# Patient Record
Sex: FEMALE | Race: BLACK OR AFRICAN AMERICAN | NOT HISPANIC OR LATINO | Employment: FULL TIME | RURAL
[De-identification: names, ages, dates, MRNs, and addresses within clinical notes are randomized per-mention and may not be internally consistent; named-entity substitution may affect disease eponyms.]

---

## 2017-05-24 ENCOUNTER — OFFICE VISIT (OUTPATIENT)
Dept: OBSTETRICS AND GYNECOLOGY | Facility: CLINIC | Age: 62
End: 2017-05-24

## 2017-05-24 VITALS
BODY MASS INDEX: 37.74 KG/M2 | DIASTOLIC BLOOD PRESSURE: 88 MMHG | WEIGHT: 213 LBS | SYSTOLIC BLOOD PRESSURE: 150 MMHG | HEIGHT: 63 IN

## 2017-05-24 DIAGNOSIS — E88.81 INSULIN RESISTANCE: Chronic | ICD-10-CM

## 2017-05-24 DIAGNOSIS — N95.1 MENOPAUSAL AND FEMALE CLIMACTERIC STATES: Primary | Chronic | ICD-10-CM

## 2017-05-24 DIAGNOSIS — E66.9 OBESITY, CLASS II, BMI 35-39.9: Chronic | ICD-10-CM

## 2017-05-24 DIAGNOSIS — I10 ESSENTIAL HYPERTENSION: Chronic | ICD-10-CM

## 2017-05-24 PROCEDURE — 99214 OFFICE O/P EST MOD 30 MIN: CPT | Performed by: OBSTETRICS & GYNECOLOGY

## 2017-05-24 RX ORDER — PHENTERMINE HYDROCHLORIDE 30 MG/1
30 CAPSULE ORAL EVERY MORNING
Qty: 60 CAPSULE | Refills: 2 | Status: SHIPPED | OUTPATIENT
Start: 2017-05-24 | End: 2017-11-01 | Stop reason: SDUPTHER

## 2017-05-24 RX ORDER — PHENTERMINE HYDROCHLORIDE 30 MG/1
30 CAPSULE ORAL EVERY MORNING
COMMUNITY
End: 2017-05-24 | Stop reason: SDUPTHER

## 2017-05-24 RX ORDER — CHLORAL HYDRATE 500 MG
1000 CAPSULE ORAL
COMMUNITY

## 2017-05-24 RX ORDER — ERGOCALCIFEROL 1.25 MG/1
50000 CAPSULE ORAL WEEKLY
COMMUNITY

## 2017-05-24 RX ORDER — THIAMINE MONONITRATE (VIT B1) 100 MG
100 TABLET ORAL DAILY
COMMUNITY

## 2017-05-24 RX ORDER — PHENTERMINE HYDROCHLORIDE 37.5 MG/1
37.5 CAPSULE ORAL
Qty: 60 CAPSULE | Refills: 2 | Status: SHIPPED | OUTPATIENT
Start: 2017-05-24 | End: 2017-11-01 | Stop reason: SDUPTHER

## 2017-05-24 RX ORDER — PHENTERMINE HYDROCHLORIDE 37.5 MG/1
37.5 CAPSULE ORAL EVERY MORNING
COMMUNITY
End: 2017-05-24 | Stop reason: SDUPTHER

## 2017-05-24 RX ORDER — CLONIDINE HYDROCHLORIDE 0.3 MG/1
0.3 TABLET ORAL DAILY
COMMUNITY

## 2017-05-24 RX ORDER — ASPIRIN 81 MG/1
81 TABLET ORAL DAILY
COMMUNITY

## 2017-05-24 RX ORDER — MELOXICAM 7.5 MG/1
7.5 TABLET ORAL DAILY
COMMUNITY
End: 2018-06-13 | Stop reason: DRUGHIGH

## 2017-08-16 ENCOUNTER — OFFICE VISIT (OUTPATIENT)
Dept: OBSTETRICS AND GYNECOLOGY | Facility: CLINIC | Age: 62
End: 2017-08-16

## 2017-08-16 VITALS
HEIGHT: 63 IN | SYSTOLIC BLOOD PRESSURE: 134 MMHG | DIASTOLIC BLOOD PRESSURE: 72 MMHG | WEIGHT: 211.6 LBS | BODY MASS INDEX: 37.49 KG/M2

## 2017-08-16 DIAGNOSIS — E88.81 INSULIN RESISTANCE: Chronic | ICD-10-CM

## 2017-08-16 DIAGNOSIS — E66.9 OBESITY, CLASS II, BMI 35-39.9: Chronic | ICD-10-CM

## 2017-08-16 DIAGNOSIS — I10 ESSENTIAL HYPERTENSION: Chronic | ICD-10-CM

## 2017-08-16 DIAGNOSIS — N95.1 MENOPAUSAL AND FEMALE CLIMACTERIC STATES: Primary | Chronic | ICD-10-CM

## 2017-08-16 PROCEDURE — 99214 OFFICE O/P EST MOD 30 MIN: CPT | Performed by: OBSTETRICS & GYNECOLOGY

## 2017-08-16 NOTE — PROGRESS NOTES
Zuleyka Maharaj is a 61 y.o. y/o female     Chief Complaint: Lethargy and weight gain and hypertension and insulin resistance.    HPI: Patient is a 61-year-old  with 3 prior vaginal deliveries who has had her tubes tied.  She is postmenopausal and not having any vaginal bleeding.   She only has rare hot flashes.  She has hypertension and insulin resistance and morbid obesity.  She is currently on clonidine, Mobic, metformin, multivitamin, vitamin D, B-1, fish oil, aspirin 81 mg, and phentermine.  She has tried Wellbutrin in the past, and she did not like the way she felt on that.  She feels that the phentermine capsules help her the most.    She is currently taking phentermine 30 mg capsule each morning and 37.5 tablet at noon.    Her blood pressures 134/72 and her pulse is 78.    Her bowels are working normally.  She does not have any constipation or diarrhea.  She does not have any trouble sleeping.  She does not have any heart palpitations.    Review of Systems   Constitutional: Positive for fatigue. Negative for activity change, appetite change, chills, diaphoresis, fever and unexpected weight change.   Gastrointestinal: Negative for abdominal pain, constipation, diarrhea and nausea.   Genitourinary: Negative for difficulty urinating, dyspareunia, dysuria, menstrual problem, pelvic pain, urgency, vaginal bleeding, vaginal discharge and vaginal pain.   Neurological: Negative for headaches.   Psychiatric/Behavioral: Positive for dysphoric mood. The patient is not nervous/anxious.    All other systems reviewed and are negative.     Breast ROS: negative    The following portions of the patient's history were reviewed and updated as appropriate: allergies, current medications, past family history, past medical history, past social history, past surgical history and problem list.    Allergies   Allergen Reactions   • Codeine           Current Outpatient Prescriptions:   •  aspirin 81 MG EC tablet, Take 81 mg  by mouth Daily., Disp: , Rfl:   •  CloNIDine (CATAPRES) 0.3 MG tablet, Take 0.3 mg by mouth Daily., Disp: , Rfl:   •  meloxicam (MOBIC) 7.5 MG tablet, Take 7.5 mg by mouth Daily., Disp: , Rfl:   •  metFORMIN (GLUCOPHAGE) 500 MG tablet, Take 500 mg by mouth 2 (Two) Times a Day With Meals., Disp: , Rfl:   •  Multiple Vitamins-Minerals (MULTI COMPLETE PO), Take 1 tablet by mouth Daily., Disp: , Rfl:   •  Omega-3 Fatty Acids (FISH OIL) 1000 MG capsule capsule, Take 1,000 mg by mouth Daily With Breakfast., Disp: , Rfl:   •  phentermine 30 MG capsule, Take 1 capsule by mouth Every Morning., Disp: 60 capsule, Rfl: 2  •  phentermine 37.5 MG capsule, Take 1 capsule by mouth Daily Before Lunch., Disp: 60 capsule, Rfl: 2  •  thiamine (VITAMIN B-1) 100 MG tablet, Take 100 mg by mouth Daily., Disp: , Rfl:   •  vitamin D (ERGOCALCIFEROL) 04711 UNITS capsule capsule, Take 50,000 Units by mouth 1 (One) Time Per Week., Disp: , Rfl:      The patient has a family history of   No family history on file.     Past Medical History:   Diagnosis Date   • Essential hypertension 5/24/2017   • Insulin resistance 5/24/2017   • Menopausal and female climacteric states 5/24/2017   • Obesity, Class II, BMI 35-39.9 5/24/2017        OB History     No data available           Social History     Social History   • Marital status:      Spouse name: N/A   • Number of children: N/A   • Years of education: N/A     Occupational History   • Not on file.     Social History Main Topics   • Smoking status: Never Smoker   • Smokeless tobacco: Never Used   • Alcohol use No   • Drug use: No   • Sexual activity: Not on file     Other Topics Concern   • Not on file     Social History Narrative        No past surgical history on file.     Patient Active Problem List   Diagnosis   • Menopausal and female climacteric states   • Obesity, Class II, BMI 35-39.9   • Essential hypertension   • Insulin resistance        Documented Vitals    08/16/17 1358   BP: 134/72  "  Weight: 211 lb 9.6 oz (96 kg)   Height: 63\" (160 cm)   PainSc: 0-No pain       Physical Exam   Constitutional: She is oriented to person, place, and time. No distress.   Obese black female weighing 211.6 pounds with BMI 37.5.  She has lost 1.4 pounds.   HENT:   Head: Normocephalic and atraumatic.   Eyes: Conjunctivae and EOM are normal. Pupils are equal, round, and reactive to light.   Neck: Normal range of motion. Neck supple. No JVD present. No tracheal deviation present. No thyromegaly present.   Cardiovascular: Normal rate, regular rhythm, normal heart sounds and intact distal pulses.  Exam reveals no gallop and no friction rub.    No murmur heard.  Pulmonary/Chest: Effort normal and breath sounds normal. No stridor. No respiratory distress. She has no wheezes. She has no rales. She exhibits no tenderness.   Abdominal: Soft. Bowel sounds are normal. She exhibits no distension and no mass. There is no tenderness. There is no rebound and no guarding. No hernia.   Musculoskeletal: Normal range of motion. She exhibits no edema, tenderness or deformity.   Lymphadenopathy:     She has no cervical adenopathy.   Neurological: She is alert and oriented to person, place, and time. She has normal reflexes. She displays normal reflexes. No cranial nerve deficit. She exhibits normal muscle tone. Coordination normal.   Skin: Skin is warm and dry. No rash noted. She is not diaphoretic. No erythema. No pallor.   Psychiatric: She has a normal mood and affect. Her behavior is normal. Judgment and thought content normal.   Nursing note and vitals reviewed.      Assessment        Diagnosis Plan   1. Menopausal and female climacteric states     2. Insulin resistance     3. Essential hypertension     4. Obesity, Class II, BMI 35-39.9           Plan      1. Continue current medications.  2. Encouraged in diet and exercise.  3. Handouts on depression, hot flashes, exercise, and vitamin use.   4. Follow-up in 6 weeks.  Follow-up " sooner as needed.            This document has been electronically signed by Wilfredo Cody MD on August 16, 2017 6:11 PM

## 2017-09-11 RX ORDER — MELOXICAM 15 MG/1
TABLET ORAL
Qty: 90 TABLET | Refills: 3 | Status: SHIPPED | OUTPATIENT
Start: 2017-09-11

## 2017-11-01 ENCOUNTER — OFFICE VISIT (OUTPATIENT)
Dept: OBSTETRICS AND GYNECOLOGY | Facility: CLINIC | Age: 62
End: 2017-11-01

## 2017-11-01 VITALS
HEIGHT: 63 IN | DIASTOLIC BLOOD PRESSURE: 70 MMHG | WEIGHT: 208.4 LBS | BODY MASS INDEX: 36.93 KG/M2 | SYSTOLIC BLOOD PRESSURE: 126 MMHG

## 2017-11-01 DIAGNOSIS — N95.1 MENOPAUSAL SYNDROME: Primary | Chronic | ICD-10-CM

## 2017-11-01 DIAGNOSIS — E66.9 OBESITY, CLASS II, BMI 35-39.9: Chronic | ICD-10-CM

## 2017-11-01 DIAGNOSIS — E88.81 INSULIN RESISTANCE: Chronic | ICD-10-CM

## 2017-11-01 DIAGNOSIS — I10 ESSENTIAL HYPERTENSION: Chronic | ICD-10-CM

## 2017-11-01 DIAGNOSIS — L03.011 INFECTION OF NAIL BED OF FINGER OF RIGHT HAND: ICD-10-CM

## 2017-11-01 PROCEDURE — 99214 OFFICE O/P EST MOD 30 MIN: CPT | Performed by: OBSTETRICS & GYNECOLOGY

## 2017-11-01 RX ORDER — PHENTERMINE HYDROCHLORIDE 37.5 MG/1
37.5 CAPSULE ORAL
Qty: 60 CAPSULE | Refills: 3 | Status: SHIPPED | OUTPATIENT
Start: 2017-11-01 | End: 2018-03-14 | Stop reason: SDUPTHER

## 2017-11-01 RX ORDER — PHENTERMINE HYDROCHLORIDE 30 MG/1
30 CAPSULE ORAL EVERY MORNING
Qty: 60 CAPSULE | Refills: 3 | Status: SHIPPED | OUTPATIENT
Start: 2017-11-01 | End: 2018-03-14 | Stop reason: SDUPTHER

## 2017-11-01 RX ORDER — CEPHALEXIN 500 MG/1
500 CAPSULE ORAL 4 TIMES DAILY
Qty: 40 CAPSULE | Refills: 0 | Status: SHIPPED | OUTPATIENT
Start: 2017-11-01 | End: 2017-11-11

## 2017-11-01 RX ORDER — FLUCONAZOLE 150 MG/1
TABLET ORAL
Qty: 2 TABLET | Refills: 11 | Status: SHIPPED | OUTPATIENT
Start: 2017-11-01 | End: 2018-09-12 | Stop reason: SDUPTHER

## 2017-11-01 NOTE — PROGRESS NOTES
Zuleyka Maharaj is a 62 y.o. y/o female     Chief Complaint: Lethargy, weight gain, hypertension, insulin resistance, and infection of the nailbed of her third finger on her right hand    HPI: Patient is a 61-year-old  with 3 prior vaginal deliveries who has had her tubes tied.      She is postmenopausal and not having any vaginal bleeding.   She only has rare hot flashes.      She has hypertension and insulin resistance and morbid obesity.  She is currently on clonidine, Mobic, metformin, multivitamin, vitamin D, B-1, fish oil, aspirin 81 mg, and phentermine.  She has tried Wellbutrin in the past, and she did not like the way she felt on that.  She feels that the phentermine capsules help her the most.     She is currently taking phentermine 30 mg capsule each morning and 37.5 tablet at noon.     Her blood pressures 126/70 and her pulse is 72.     Her bowels are working normally.  She does not have any constipation or diarrhea.  She does not have any trouble sleeping.  She does not have any heart palpitations.    She has an infection in the nail bed of the third finger on her right hand.  She has been using Neosporin, but it has not cleared.    Review of Systems   Constitutional: Positive for fatigue. Negative for activity change, appetite change, chills, diaphoresis, fever and unexpected weight change.   Gastrointestinal: Negative for abdominal pain, constipation, diarrhea and nausea.   Genitourinary: Negative for difficulty urinating, dyspareunia, dysuria, menstrual problem, pelvic pain, urgency, vaginal bleeding, vaginal discharge and vaginal pain.   Neurological: Negative for headaches.   Psychiatric/Behavioral: Positive for dysphoric mood. The patient is not nervous/anxious.    All other systems reviewed and are negative.     Breast ROS: negative    The following portions of the patient's history were reviewed and updated as appropriate: allergies, current medications, past family history, past  medical history, past social history, past surgical history and problem list.    Allergies   Allergen Reactions   • Codeine           Current Outpatient Prescriptions:   •  aspirin 81 MG EC tablet, Take 81 mg by mouth Daily., Disp: , Rfl:   •  CloNIDine (CATAPRES) 0.3 MG tablet, Take 0.3 mg by mouth Daily., Disp: , Rfl:   •  meloxicam (MOBIC) 15 MG tablet, take 1 tablet by mouth once daily, Disp: 90 tablet, Rfl: 3  •  meloxicam (MOBIC) 7.5 MG tablet, Take 7.5 mg by mouth Daily., Disp: , Rfl:   •  metFORMIN (GLUCOPHAGE) 500 MG tablet, Take 500 mg by mouth 2 (Two) Times a Day With Meals., Disp: , Rfl:   •  Multiple Vitamins-Minerals (MULTI COMPLETE PO), Take 1 tablet by mouth Daily., Disp: , Rfl:   •  Omega-3 Fatty Acids (FISH OIL) 1000 MG capsule capsule, Take 1,000 mg by mouth Daily With Breakfast., Disp: , Rfl:   •  phentermine 30 MG capsule, Take 1 capsule by mouth Every Morning., Disp: 60 capsule, Rfl: 3  •  phentermine 37.5 MG capsule, Take 1 capsule by mouth Daily Before Lunch., Disp: 60 capsule, Rfl: 3  •  thiamine (VITAMIN B-1) 100 MG tablet, Take 100 mg by mouth Daily., Disp: , Rfl:   •  vitamin D (ERGOCALCIFEROL) 46553 UNITS capsule capsule, Take 50,000 Units by mouth 1 (One) Time Per Week., Disp: , Rfl:   •  cephalexin (KEFLEX) 500 MG capsule, Take 1 capsule by mouth 4 (Four) Times a Day for 10 days., Disp: 40 capsule, Rfl: 0  •  fluconazole (DIFLUCAN) 150 MG tablet, Take one po today and take one po in 4 days., Disp: 2 tablet, Rfl: 11     The patient has a family history of   History reviewed. No pertinent family history.     Past Medical History:   Diagnosis Date   • Essential hypertension 5/24/2017   • Insulin resistance 5/24/2017   • Menopausal and female climacteric states 5/24/2017   • Obesity, Class II, BMI 35-39.9 5/24/2017        OB History     No data available           Social History     Social History   • Marital status:      Spouse name: N/A   • Number of children: N/A   • Years of  "education: N/A     Occupational History   • Not on file.     Social History Main Topics   • Smoking status: Never Smoker   • Smokeless tobacco: Never Used   • Alcohol use No   • Drug use: No   • Sexual activity: Not on file     Other Topics Concern   • Not on file     Social History Narrative        No past surgical history on file.     Patient Active Problem List   Diagnosis   • Menopausal and female climacteric states   • Obesity, Class II, BMI 35-39.9   • Essential hypertension   • Insulin resistance        Documented Vitals    11/01/17 1131   BP: 126/70   Weight: 208 lb 6.4 oz (94.5 kg)   Height: 63\" (160 cm)       Physical Exam   Constitutional: She is oriented to person, place, and time. No distress.   Obese black female weighing 208.4 pounds with BMI 36.9.  She lost about 3 pounds since her last visit.    Blood pressure 126/70.  Pulse 72.   HENT:   Head: Normocephalic and atraumatic.   Eyes: Conjunctivae and EOM are normal. Pupils are equal, round, and reactive to light.   Neck: Normal range of motion. Neck supple. No JVD present. No tracheal deviation present. No thyromegaly present.   Cardiovascular: Normal rate, regular rhythm, normal heart sounds and intact distal pulses.  Exam reveals no gallop and no friction rub.    No murmur heard.  Pulmonary/Chest: Effort normal and breath sounds normal. No stridor. No respiratory distress. She has no wheezes. She has no rales. She exhibits no tenderness.   Abdominal: Soft. Bowel sounds are normal. She exhibits no distension and no mass. There is no tenderness. There is no rebound and no guarding. No hernia.   Musculoskeletal: Normal range of motion. She exhibits no edema, tenderness or deformity.   Lymphadenopathy:     She has no cervical adenopathy.   Neurological: She is alert and oriented to person, place, and time. She has normal reflexes. She displays normal reflexes. No cranial nerve deficit. She exhibits normal muscle tone. Coordination normal.   Skin: Skin " is warm and dry. No rash noted. She is not diaphoretic. No erythema. No pallor.   She has an infection of the cuticle of the third finger on her right hand.   Psychiatric: She has a normal mood and affect. Her behavior is normal. Judgment and thought content normal.   Nursing note and vitals reviewed.      Assessment        Diagnosis Plan   1. Menopausal and female climacteric states     2. Infection of nail bed of finger of right hand     3. Insulin resistance     4. Essential hypertension     5. Obesity, Class II, BMI 35-39.9           Plan      1. Keflex 500 mg 4 times a day and Diflucan today and in 4 days.  2. Phentermine 30 mg capsule each morning and 37.5 tablet each noon.  3. Encouraged in diet and exercise.  4. Follow-up in 6 weeks.  Follow-up sooner as needed.            This document has been electronically signed by Wilfredo Cody MD on November 1, 2017 6:51 PM

## 2018-03-14 ENCOUNTER — OFFICE VISIT (OUTPATIENT)
Dept: OBSTETRICS AND GYNECOLOGY | Facility: CLINIC | Age: 63
End: 2018-03-14

## 2018-03-14 VITALS
BODY MASS INDEX: 37.03 KG/M2 | WEIGHT: 209 LBS | HEIGHT: 63 IN | DIASTOLIC BLOOD PRESSURE: 82 MMHG | SYSTOLIC BLOOD PRESSURE: 128 MMHG

## 2018-03-14 DIAGNOSIS — I10 ESSENTIAL HYPERTENSION: ICD-10-CM

## 2018-03-14 DIAGNOSIS — N95.1 MENOPAUSAL SYNDROME: Primary | ICD-10-CM

## 2018-03-14 DIAGNOSIS — E66.9 OBESITY, CLASS II, BMI 35-39.9: ICD-10-CM

## 2018-03-14 DIAGNOSIS — E88.81 INSULIN RESISTANCE: ICD-10-CM

## 2018-03-14 PROCEDURE — 99214 OFFICE O/P EST MOD 30 MIN: CPT | Performed by: OBSTETRICS & GYNECOLOGY

## 2018-03-14 RX ORDER — SITAGLIPTIN 100 MG/1
TABLET, FILM COATED ORAL
Refills: 0 | COMMUNITY
Start: 2018-03-12

## 2018-03-14 RX ORDER — BLOOD SUGAR DIAGNOSTIC
STRIP MISCELLANEOUS
Refills: 0 | COMMUNITY
Start: 2018-01-11

## 2018-03-14 RX ORDER — PHENTERMINE HYDROCHLORIDE 37.5 MG/1
37.5 CAPSULE ORAL
Qty: 60 CAPSULE | Refills: 3 | Status: SHIPPED | OUTPATIENT
Start: 2018-03-14 | End: 2018-06-13 | Stop reason: SDUPTHER

## 2018-03-14 RX ORDER — ROSUVASTATIN CALCIUM 20 MG/1
TABLET, COATED ORAL
Refills: 0 | COMMUNITY
Start: 2018-03-08

## 2018-03-14 RX ORDER — PHENTERMINE HYDROCHLORIDE 30 MG/1
30 CAPSULE ORAL EVERY MORNING
Qty: 60 CAPSULE | Refills: 3 | Status: SHIPPED | OUTPATIENT
Start: 2018-03-14 | End: 2018-06-13 | Stop reason: DRUGHIGH

## 2018-03-26 NOTE — PROGRESS NOTES
Zuleyka Maharaj is a 62 y.o. y/o female     Chief Complaint: Lethargy, hypertension, weight gain, insulin resistance    HPI:  Patient is a 62-year-old  with three prior vaginal deliveries who has had her tubes tied.       She is postmenopausal and not having any vaginal bleeding.   She only has rare hot flashes.       She has hypertension and insulin resistance and morbid obesity.  She is currently on clonidine, Mobic, metformin, multivitamin, vitamin D, B-1, fish oil, aspirin 81 mg, and phentermine.  She has tried Wellbutrin in the past, and she did not like the way she felt on that.  She feels that the phentermine capsules help her the most.     She is currently taking phentermine 30 mg capsule each morning and 37.5 tablet at noon.     Her bowels are working normally.  She does not have any constipation or diarrhea.  She does not have any trouble sleeping.  She does not have any heart palpitations.    Her blood pressures fairly well controlled, however her weight loss has plateaued.    We're going to try her on Estrogel and norethindrone acetate.     Review of Systems   Constitutional: Positive for fatigue. Negative for activity change, appetite change, chills, diaphoresis, fever and unexpected weight change.   Gastrointestinal: Negative for abdominal pain, constipation, diarrhea and nausea.   Genitourinary: Negative for difficulty urinating, dyspareunia, dysuria, menstrual problem, pelvic pain, urgency, vaginal bleeding, vaginal discharge and vaginal pain.   Neurological: Negative for headaches.   Psychiatric/Behavioral: Positive for dysphoric mood. The patient is not nervous/anxious.    All other systems reviewed and are negative.     Breast ROS: negative    The following portions of the patient's history were reviewed and updated as appropriate: allergies, current medications, past family history, past medical history, past social history, past surgical history and problem list.    Allergies    Allergen Reactions   • Codeine           Current Outpatient Prescriptions:   •  aspirin 81 MG EC tablet, Take 81 mg by mouth Daily., Disp: , Rfl:   •  CloNIDine (CATAPRES) 0.3 MG tablet, Take 0.3 mg by mouth Daily., Disp: , Rfl:   •  fluconazole (DIFLUCAN) 150 MG tablet, Take one po today and take one po in 4 days., Disp: 2 tablet, Rfl: 11  •  meloxicam (MOBIC) 15 MG tablet, take 1 tablet by mouth once daily, Disp: 90 tablet, Rfl: 3  •  meloxicam (MOBIC) 7.5 MG tablet, Take 7.5 mg by mouth Daily., Disp: , Rfl:   •  metFORMIN (GLUCOPHAGE) 500 MG tablet, Take 500 mg by mouth 2 (Two) Times a Day With Meals., Disp: , Rfl:   •  Multiple Vitamins-Minerals (MULTI COMPLETE PO), Take 1 tablet by mouth Daily., Disp: , Rfl:   •  Omega-3 Fatty Acids (FISH OIL) 1000 MG capsule capsule, Take 1,000 mg by mouth Daily With Breakfast., Disp: , Rfl:   •  phentermine 30 MG capsule, Take 1 capsule by mouth Every Morning., Disp: 60 capsule, Rfl: 3  •  phentermine 37.5 MG capsule, Take 1 capsule by mouth Daily Before Lunch., Disp: 60 capsule, Rfl: 3  •  thiamine (VITAMIN B-1) 100 MG tablet, Take 100 mg by mouth Daily., Disp: , Rfl:   •  vitamin D (ERGOCALCIFEROL) 92527 UNITS capsule capsule, Take 50,000 Units by mouth 1 (One) Time Per Week., Disp: , Rfl:   •  Estradiol (ESTROGEL) 0.75 MG/1.25 GM (0.06%) topical gel, Place 1.25 g on the skin Daily., Disp: 50 g, Rfl: 12  •  JANUVIA 100 MG tablet, , Disp: , Rfl: 0  •  norethindrone (AYGESTIN) 5 MG tablet, Take 1 tablet by mouth Daily., Disp: 90 tablet, Rfl: 4  •  ONETOUCH VERIO test strip, , Disp: , Rfl: 0  •  rosuvastatin (CRESTOR) 20 MG tablet, , Disp: , Rfl: 0     The patient has a family history of   No family history on file.     Past Medical History:   Diagnosis Date   • Essential hypertension 5/24/2017   • Insulin resistance 5/24/2017   • Menopausal and female climacteric states 5/24/2017   • Obesity, Class II, BMI 35-39.9 5/24/2017        OB History     No data available        "    Social History     Social History   • Marital status:      Spouse name: N/A   • Number of children: N/A   • Years of education: N/A     Occupational History   • Not on file.     Social History Main Topics   • Smoking status: Never Smoker   • Smokeless tobacco: Never Used   • Alcohol use No   • Drug use: No   • Sexual activity: Not on file     Other Topics Concern   • Not on file     Social History Narrative   • No narrative on file        No past surgical history on file.     Patient Active Problem List   Diagnosis   • Menopausal and female climacteric states   • Obesity, Class II, BMI 35-39.9   • Essential hypertension   • Insulin resistance        Documented Vitals    03/14/18 1406   BP: 128/82   Weight: 94.8 kg (209 lb)   Height: 160 cm (63\")       Physical Exam   Constitutional: She is oriented to person, place, and time. No distress.   Obese black female weighing 209 pounds with BMI 37.0.  Blood pressure 120/82.  Pulse 74.   HENT:   Head: Normocephalic and atraumatic.   Eyes: Conjunctivae and EOM are normal. Pupils are equal, round, and reactive to light.   Neck: Normal range of motion. Neck supple. No JVD present. No tracheal deviation present. No thyromegaly present.   Cardiovascular: Normal rate, regular rhythm, normal heart sounds and intact distal pulses.  Exam reveals no gallop and no friction rub.    No murmur heard.  Pulmonary/Chest: Effort normal and breath sounds normal. No stridor. No respiratory distress. She has no wheezes. She has no rales. She exhibits no tenderness.   Abdominal: Soft. Bowel sounds are normal. She exhibits no distension and no mass. There is no tenderness. There is no rebound and no guarding. No hernia.   Musculoskeletal: Normal range of motion. She exhibits no edema, tenderness or deformity.   Lymphadenopathy:     She has no cervical adenopathy.   Neurological: She is alert and oriented to person, place, and time. She has normal reflexes. She displays normal reflexes. " No cranial nerve deficit. She exhibits normal muscle tone. Coordination normal.   Skin: Skin is warm and dry. No rash noted. She is not diaphoretic. No erythema. No pallor.   Psychiatric: She has a normal mood and affect. Her behavior is normal. Judgment and thought content normal.   Nursing note and vitals reviewed.       Assessment        Diagnosis Plan   1. Menopausal and female climacteric states     2. Insulin resistance     3. Essential hypertension     4. Obesity, Class II, BMI 35-39.9           Plan      1. Continue phentermine 30 mg capsule each morning and 37.5 mg tablet each noon.  2. Encouraged in diet and exercise.   3. Follow-up in 6 weeks.  Follow-up sooner as needed.            This document has been electronically signed by Wilfredo Cody MD on March 25, 2018 10:06 PM

## 2018-06-13 ENCOUNTER — OFFICE VISIT (OUTPATIENT)
Dept: OBSTETRICS AND GYNECOLOGY | Facility: CLINIC | Age: 63
End: 2018-06-13

## 2018-06-13 VITALS
HEIGHT: 63 IN | DIASTOLIC BLOOD PRESSURE: 82 MMHG | SYSTOLIC BLOOD PRESSURE: 146 MMHG | WEIGHT: 207 LBS | BODY MASS INDEX: 36.68 KG/M2

## 2018-06-13 DIAGNOSIS — E66.9 OBESITY, CLASS II, BMI 35-39.9: ICD-10-CM

## 2018-06-13 DIAGNOSIS — I10 ESSENTIAL HYPERTENSION: ICD-10-CM

## 2018-06-13 DIAGNOSIS — E88.81 INSULIN RESISTANCE: ICD-10-CM

## 2018-06-13 DIAGNOSIS — N95.1 MENOPAUSAL SYNDROME: Primary | ICD-10-CM

## 2018-06-13 PROCEDURE — 99213 OFFICE O/P EST LOW 20 MIN: CPT | Performed by: OBSTETRICS & GYNECOLOGY

## 2018-06-13 RX ORDER — PHENTERMINE HYDROCHLORIDE 37.5 MG/1
37.5 CAPSULE ORAL
Qty: 60 CAPSULE | Refills: 3 | Status: SHIPPED | OUTPATIENT
Start: 2018-06-13 | End: 2018-09-12 | Stop reason: SDUPTHER

## 2018-06-13 RX ORDER — PHENTERMINE HYDROCHLORIDE 30 MG/1
30 CAPSULE ORAL EVERY MORNING
Qty: 60 CAPSULE | Refills: 3 | Status: SHIPPED | OUTPATIENT
Start: 2018-06-13 | End: 2018-09-12 | Stop reason: SDUPTHER

## 2018-09-12 ENCOUNTER — OFFICE VISIT (OUTPATIENT)
Dept: OBSTETRICS AND GYNECOLOGY | Facility: CLINIC | Age: 63
End: 2018-09-12

## 2018-09-12 VITALS
SYSTOLIC BLOOD PRESSURE: 154 MMHG | HEIGHT: 63 IN | BODY MASS INDEX: 37.03 KG/M2 | DIASTOLIC BLOOD PRESSURE: 74 MMHG | WEIGHT: 209 LBS

## 2018-09-12 DIAGNOSIS — E88.81 INSULIN RESISTANCE: ICD-10-CM

## 2018-09-12 DIAGNOSIS — B37.31 YEAST INFECTION OF THE VAGINA: ICD-10-CM

## 2018-09-12 DIAGNOSIS — N95.1 MENOPAUSAL SYNDROME: Primary | ICD-10-CM

## 2018-09-12 DIAGNOSIS — I10 ESSENTIAL HYPERTENSION: ICD-10-CM

## 2018-09-12 DIAGNOSIS — E66.9 OBESITY, CLASS II, BMI 35-39.9: ICD-10-CM

## 2018-09-12 PROCEDURE — 99213 OFFICE O/P EST LOW 20 MIN: CPT | Performed by: OBSTETRICS & GYNECOLOGY

## 2018-09-12 RX ORDER — PHENTERMINE HYDROCHLORIDE 37.5 MG/1
37.5 CAPSULE ORAL
Qty: 60 CAPSULE | Refills: 3 | Status: SHIPPED | OUTPATIENT
Start: 2018-09-12

## 2018-09-12 RX ORDER — FLUCONAZOLE 150 MG/1
TABLET ORAL
Qty: 2 TABLET | Refills: 12 | Status: SHIPPED | OUTPATIENT
Start: 2018-09-12

## 2018-09-12 RX ORDER — PHENTERMINE HYDROCHLORIDE 30 MG/1
30 CAPSULE ORAL EVERY MORNING
Qty: 60 CAPSULE | Refills: 3 | Status: SHIPPED | OUTPATIENT
Start: 2018-09-12

## 2018-09-12 NOTE — PROGRESS NOTES
Zuleyka Maharaj is a 63 y.o. y/o female     Chief Complaint: Lethargy, hypertension, weight gain, insulin resistance    HPI:  Patient is a 62-year-old  with three prior vaginal deliveries who has had her tubes tied.       She is postmenopausal and not having any vaginal bleeding.   She only has rare hot flashes.       She has hypertension and insulin resistance and morbid obesity.  She is currently on clonidine, Mobic, metformin, multivitamin, vitamin D, B-1, fish oil, aspirin 81 mg, and phentermine.  She has tried Wellbutrin in the past, and she did not like the way she felt on that.  She feels that the phentermine capsules help her the most.     She is currently taking phentermine 30 mg capsule each morning and 37.5 tablet at noon.     Her bowels are working normally.  She does not have any constipation or diarrhea.  She does not have any trouble sleeping.  She does not have any heart palpitations.    Her blood pressures fairly well controlled, however her weight loss has plateaued.    2018, she is still having problems with her knee.  Refilled her phentermine and Diflucan.  She has an appointment with Dr. Leos next month.     Review of Systems   Constitutional: Positive for fatigue. Negative for activity change, appetite change, chills, diaphoresis, fever and unexpected weight change.   Gastrointestinal: Negative for abdominal pain, constipation, diarrhea and nausea.   Genitourinary: Negative for difficulty urinating, dyspareunia, dysuria, menstrual problem, pelvic pain, urgency, vaginal bleeding, vaginal discharge and vaginal pain.   Neurological: Negative for headaches.   Psychiatric/Behavioral: Positive for dysphoric mood. The patient is not nervous/anxious.    All other systems reviewed and are negative.     Breast ROS: negative    The following portions of the patient's history were reviewed and updated as appropriate: allergies, current medications, past family history, past medical  history, past social history, past surgical history and problem list.    Allergies   Allergen Reactions   • Codeine           Current Outpatient Prescriptions:   •  aspirin 81 MG EC tablet, Take 81 mg by mouth Daily., Disp: , Rfl:   •  CloNIDine (CATAPRES) 0.3 MG tablet, Take 0.3 mg by mouth Daily., Disp: , Rfl:   •  fluconazole (DIFLUCAN) 150 MG tablet, Take one po today and take one po in 4 days., Disp: 2 tablet, Rfl: 12  •  JANUVIA 100 MG tablet, , Disp: , Rfl: 0  •  meloxicam (MOBIC) 15 MG tablet, take 1 tablet by mouth once daily, Disp: 90 tablet, Rfl: 3  •  metFORMIN (GLUCOPHAGE) 500 MG tablet, Take 500 mg by mouth 2 (Two) Times a Day With Meals., Disp: , Rfl:   •  Multiple Vitamins-Minerals (MULTI COMPLETE PO), Take 1 tablet by mouth Daily., Disp: , Rfl:   •  Omega-3 Fatty Acids (FISH OIL) 1000 MG capsule capsule, Take 1,000 mg by mouth Daily With Breakfast., Disp: , Rfl:   •  ONETOUCH VERIO test strip, , Disp: , Rfl: 0  •  phentermine 30 MG capsule, Take 1 capsule by mouth Every Morning., Disp: 60 capsule, Rfl: 3  •  phentermine 37.5 MG capsule, Take 1 capsule by mouth Daily Before Lunch., Disp: 60 capsule, Rfl: 3  •  rosuvastatin (CRESTOR) 20 MG tablet, , Disp: , Rfl: 0  •  thiamine (VITAMIN B-1) 100 MG tablet, Take 100 mg by mouth Daily., Disp: , Rfl:   •  vitamin D (ERGOCALCIFEROL) 31891 UNITS capsule capsule, Take 50,000 Units by mouth 1 (One) Time Per Week., Disp: , Rfl:      The patient has a family history of   History reviewed. No pertinent family history.     Past Medical History:   Diagnosis Date   • Essential hypertension 5/24/2017   • Insulin resistance 5/24/2017   • Menopausal and female climacteric states 5/24/2017   • Obesity, Class II, BMI 35-39.9 5/24/2017        OB History     No data available           Social History     Social History   • Marital status:      Spouse name: N/A   • Number of children: N/A   • Years of education: N/A     Occupational History   • Not on file.  "    Social History Main Topics   • Smoking status: Never Smoker   • Smokeless tobacco: Never Used   • Alcohol use No   • Drug use: No   • Sexual activity: Not on file     Other Topics Concern   • Not on file     Social History Narrative   • No narrative on file        No past surgical history on file.     Patient Active Problem List   Diagnosis   • Menopausal and female climacteric states   • Obesity, Class II, BMI 35-39.9   • Essential hypertension   • Insulin resistance        Documented Vitals    09/12/18 1026   BP: 154/74   Weight: 94.8 kg (209 lb)   Height: 160 cm (63\")       Physical Exam   Constitutional: She is oriented to person, place, and time. No distress.   Obese black female weighing 209 pounds with BMI 37.0.  Blood pressure 154/74.  Pulse 82.   HENT:   Head: Normocephalic and atraumatic.   Eyes: Pupils are equal, round, and reactive to light. Conjunctivae and EOM are normal.   Neck: Normal range of motion. Neck supple. No JVD present. No tracheal deviation present. No thyromegaly present.   Cardiovascular: Normal rate, regular rhythm, normal heart sounds and intact distal pulses.  Exam reveals no gallop and no friction rub.    No murmur heard.  Pulmonary/Chest: Effort normal and breath sounds normal. No stridor. No respiratory distress. She has no wheezes. She has no rales. She exhibits no tenderness.   Abdominal: Soft. Bowel sounds are normal. She exhibits no distension and no mass. There is no tenderness. There is no rebound and no guarding. No hernia.   Musculoskeletal: Normal range of motion. She exhibits no edema, tenderness or deformity.   Lymphadenopathy:     She has no cervical adenopathy.   Neurological: She is alert and oriented to person, place, and time. She has normal reflexes. She displays normal reflexes. No cranial nerve deficit. She exhibits normal muscle tone. Coordination normal.   Skin: Skin is warm and dry. No rash noted. She is not diaphoretic. No erythema. No pallor. "   Psychiatric: She has a normal mood and affect. Her behavior is normal. Judgment and thought content normal.   Nursing note and vitals reviewed.       Assessment        Diagnosis Plan   1. Menopausal and female climacteric states     2. Insulin resistance     3. Essential hypertension     4. Yeast infection of the vagina     5. Obesity, Class II, BMI 35-39.9           Plan      1. Continue phentermine 30 mg capsule each morning and 37.5 mg tablet each noon.  2. Diflucan  3. Encouraged in diet and exercise.   4. Follow-up in 3 months.  Follow-up sooner as needed.            This document has been electronically signed by Wilfredo Cody MD on September 12, 2018 11:09 AM

## 2018-09-12 NOTE — PROGRESS NOTES
Zuleyka Maharaj is a 63 y.o. y/o female     Chief Complaint: Lethargy, hypertension, weight gain, insulin resistance    HPI:  Patient is a 62-year-old  with three prior vaginal deliveries who has had her tubes tied.       She is postmenopausal and not having any vaginal bleeding.   She only has rare hot flashes.       She has hypertension and insulin resistance and morbid obesity.  She is currently on clonidine, Mobic, metformin, multivitamin, vitamin D, B-1, fish oil, aspirin 81 mg, and phentermine.  She has tried Wellbutrin in the past, and she did not like the way she felt on that.  She feels that the phentermine capsules help her the most.     She is currently taking phentermine 30 mg capsule each morning and 37.5 tablet at noon.     Her bowels are working normally.  She does not have any constipation or diarrhea.  She does not have any trouble sleeping.  She does not have any heart palpitations.    Her blood pressures fairly well controlled, however her weight loss has plateaued.    We're going to try her on Estrogel and norethindrone acetate.     Review of Systems   Constitutional: Positive for fatigue. Negative for activity change, appetite change, chills, diaphoresis, fever and unexpected weight change.   Gastrointestinal: Negative for abdominal pain, constipation, diarrhea and nausea.   Genitourinary: Negative for difficulty urinating, dyspareunia, dysuria, menstrual problem, pelvic pain, urgency, vaginal bleeding, vaginal discharge and vaginal pain.   Neurological: Negative for headaches.   Psychiatric/Behavioral: Positive for dysphoric mood. The patient is not nervous/anxious.    All other systems reviewed and are negative.     Breast ROS: negative    The following portions of the patient's history were reviewed and updated as appropriate: allergies, current medications, past family history, past medical history, past social history, past surgical history and problem list.    Allergies    Allergen Reactions   • Codeine           Current Outpatient Prescriptions:   •  aspirin 81 MG EC tablet, Take 81 mg by mouth Daily., Disp: , Rfl:   •  CloNIDine (CATAPRES) 0.3 MG tablet, Take 0.3 mg by mouth Daily., Disp: , Rfl:   •  JANUVIA 100 MG tablet, , Disp: , Rfl: 0  •  meloxicam (MOBIC) 15 MG tablet, take 1 tablet by mouth once daily, Disp: 90 tablet, Rfl: 3  •  metFORMIN (GLUCOPHAGE) 500 MG tablet, Take 500 mg by mouth 2 (Two) Times a Day With Meals., Disp: , Rfl:   •  Multiple Vitamins-Minerals (MULTI COMPLETE PO), Take 1 tablet by mouth Daily., Disp: , Rfl:   •  Omega-3 Fatty Acids (FISH OIL) 1000 MG capsule capsule, Take 1,000 mg by mouth Daily With Breakfast., Disp: , Rfl:   •  ONETOUCH VERIO test strip, , Disp: , Rfl: 0  •  rosuvastatin (CRESTOR) 20 MG tablet, , Disp: , Rfl: 0  •  thiamine (VITAMIN B-1) 100 MG tablet, Take 100 mg by mouth Daily., Disp: , Rfl:   •  vitamin D (ERGOCALCIFEROL) 78617 UNITS capsule capsule, Take 50,000 Units by mouth 1 (One) Time Per Week., Disp: , Rfl:   •  fluconazole (DIFLUCAN) 150 MG tablet, Take one po today and take one po in 4 days., Disp: 2 tablet, Rfl: 12  •  phentermine 30 MG capsule, Take 1 capsule by mouth Every Morning., Disp: 60 capsule, Rfl: 3  •  phentermine 37.5 MG capsule, Take 1 capsule by mouth Daily Before Lunch., Disp: 60 capsule, Rfl: 3     The patient has a family history of   No family history on file.     Past Medical History:   Diagnosis Date   • Essential hypertension 5/24/2017   • Insulin resistance 5/24/2017   • Menopausal and female climacteric states 5/24/2017   • Obesity, Class II, BMI 35-39.9 5/24/2017        OB History     No data available           Social History     Social History   • Marital status:      Spouse name: N/A   • Number of children: N/A   • Years of education: N/A     Occupational History   • Not on file.     Social History Main Topics   • Smoking status: Never Smoker   • Smokeless tobacco: Never Used   • Alcohol  "use No   • Drug use: No   • Sexual activity: Not on file     Other Topics Concern   • Not on file     Social History Narrative   • No narrative on file        No past surgical history on file.     Patient Active Problem List   Diagnosis   • Menopausal and female climacteric states   • Obesity, Class II, BMI 35-39.9   • Essential hypertension   • Insulin resistance        Documented Vitals    06/13/18 1425   BP: 146/82   Weight: 93.9 kg (207 lb)   Height: 160 cm (63\")   PainSc: 0-No pain       Physical Exam   Constitutional: She is oriented to person, place, and time. No distress.   Obese black female weighing 207 pounds with BMI 37.0  Blood pressure 146/82.  Pulse 78   HENT:   Head: Normocephalic and atraumatic.   Eyes: Pupils are equal, round, and reactive to light. Conjunctivae and EOM are normal.   Neck: Normal range of motion. Neck supple. No JVD present. No tracheal deviation present. No thyromegaly present.   Cardiovascular: Normal rate, regular rhythm, normal heart sounds and intact distal pulses.  Exam reveals no gallop and no friction rub.    No murmur heard.  Pulmonary/Chest: Effort normal and breath sounds normal. No stridor. No respiratory distress. She has no wheezes. She has no rales. She exhibits no tenderness.   Abdominal: Soft. Bowel sounds are normal. She exhibits no distension and no mass. There is no tenderness. There is no rebound and no guarding. No hernia.   Musculoskeletal: Normal range of motion. She exhibits no edema, tenderness or deformity.   Lymphadenopathy:     She has no cervical adenopathy.   Neurological: She is alert and oriented to person, place, and time. She has normal reflexes. She displays normal reflexes. No cranial nerve deficit. She exhibits normal muscle tone. Coordination normal.   Skin: Skin is warm and dry. No rash noted. She is not diaphoretic. No erythema. No pallor.   Psychiatric: She has a normal mood and affect. Her behavior is normal. Judgment and thought content " normal.   Nursing note and vitals reviewed.       Assessment        Diagnosis Plan   1. Menopausal and female climacteric states     2. Insulin resistance     3. Essential hypertension     4. Obesity, Class II, BMI 35-39.9           Plan      1. Continue phentermine 30 mg capsule each morning and 37.5 mg tablet each noon.  2. Encouraged in diet and exercise.   3. Follow-up in 3 months.  Follow-up sooner as needed.            This document has been electronically signed by Wilfredo Cody MD on September 12, 2018 11:06 AM

## 2018-11-12 ENCOUNTER — LAB (OUTPATIENT)
Dept: LAB | Facility: HOSPITAL | Age: 63
End: 2018-11-12

## 2018-11-12 ENCOUNTER — TRANSCRIBE ORDERS (OUTPATIENT)
Dept: LAB | Facility: HOSPITAL | Age: 63
End: 2018-11-12

## 2018-11-12 DIAGNOSIS — M25.561 ACUTE PAIN OF RIGHT KNEE: ICD-10-CM

## 2018-11-12 DIAGNOSIS — M25.561 ACUTE PAIN OF RIGHT KNEE: Primary | ICD-10-CM

## 2018-11-12 PROCEDURE — 85027 COMPLETE CBC AUTOMATED: CPT

## 2018-11-12 PROCEDURE — 85651 RBC SED RATE NONAUTOMATED: CPT

## 2018-11-12 PROCEDURE — 86140 C-REACTIVE PROTEIN: CPT

## 2018-11-13 LAB
CRP SERPL-MCNC: <0.5 MG/DL (ref 0–1)
DEPRECATED RDW RBC AUTO: 47 FL (ref 36.4–46.3)
ERYTHROCYTE [DISTWIDTH] IN BLOOD BY AUTOMATED COUNT: 14.6 % (ref 11.5–14.5)
ERYTHROCYTE [SEDIMENTATION RATE] IN BLOOD: 29 MM/HR (ref 0–20)
HCT VFR BLD AUTO: 37.6 % (ref 35–45)
HGB BLD-MCNC: 12.2 G/DL (ref 12–15.5)
MCH RBC QN AUTO: 28.3 PG (ref 26.5–34)
MCHC RBC AUTO-ENTMCNC: 32.4 G/DL (ref 31.4–36)
MCV RBC AUTO: 87.2 FL (ref 80–98)
PLATELET # BLD AUTO: 308 10*3/MM3 (ref 150–450)
PMV BLD AUTO: 10.6 FL (ref 8–12)
RBC # BLD AUTO: 4.31 10*6/MM3 (ref 3.77–5.16)
WBC NRBC COR # BLD: 10.37 10*3/MM3 (ref 3.2–9.8)

## 2018-12-18 RX ORDER — PHENTERMINE HYDROCHLORIDE 37.5 MG/1
CAPSULE ORAL
Qty: 60 CAPSULE | Refills: 2 | OUTPATIENT
Start: 2018-12-18

## 2019-01-10 ENCOUNTER — HOSPITAL ENCOUNTER (OUTPATIENT)
Dept: PREADMISSION TESTING | Age: 64
Discharge: HOME | End: 2019-01-14
Payer: COMMERCIAL

## 2019-01-10 ENCOUNTER — HOSPITAL ENCOUNTER (OUTPATIENT)
Dept: GENERAL RADIOLOGY | Age: 64
Discharge: HOME | End: 2019-01-10
Payer: COMMERCIAL

## 2019-01-10 VITALS — HEIGHT: 63 IN | BODY MASS INDEX: 37.39 KG/M2 | WEIGHT: 211 LBS

## 2019-01-10 LAB
ALBUMIN SERPL-MCNC: 4 G/DL (ref 3.5–5.2)
ALP BLD-CCNC: 78 U/L (ref 35–104)
ALT SERPL-CCNC: 25 U/L (ref 5–33)
ANION GAP SERPL CALCULATED.3IONS-SCNC: 10 MMOL/L (ref 7–19)
APTT: 27.1 SEC (ref 26–36.2)
AST SERPL-CCNC: 23 U/L (ref 5–32)
BACTERIA: NEGATIVE /HPF
BASOPHILS ABSOLUTE: 0 K/UL (ref 0–0.2)
BASOPHILS RELATIVE PERCENT: 0.3 % (ref 0–1)
BILIRUB SERPL-MCNC: <0.2 MG/DL (ref 0.2–1.2)
BILIRUBIN URINE: NEGATIVE
BLOOD, URINE: ABNORMAL
BUN BLDV-MCNC: 16 MG/DL (ref 8–23)
C-REACTIVE PROTEIN: 0.1 MG/DL (ref 0–0.5)
CALCIUM SERPL-MCNC: 10.1 MG/DL (ref 8.8–10.2)
CHLORIDE BLD-SCNC: 109 MMOL/L (ref 98–111)
CLARITY: CLEAR
CO2: 25 MMOL/L (ref 22–29)
COLOR: YELLOW
CREAT SERPL-MCNC: 0.5 MG/DL (ref 0.5–0.9)
EOSINOPHILS ABSOLUTE: 0.2 K/UL (ref 0–0.6)
EOSINOPHILS RELATIVE PERCENT: 1.7 % (ref 0–5)
EPITHELIAL CELLS, UA: 3 /HPF (ref 0–5)
GFR NON-AFRICAN AMERICAN: >60
GLUCOSE BLD-MCNC: 93 MG/DL (ref 74–109)
GLUCOSE URINE: NEGATIVE MG/DL
HBA1C MFR BLD: 6 % (ref 4–6)
HCT VFR BLD CALC: 39.4 % (ref 37–47)
HEMOGLOBIN: 12.1 G/DL (ref 12–16)
HYALINE CASTS: 3 /HPF (ref 0–8)
INR BLD: 1.04 (ref 0.88–1.18)
KETONES, URINE: NEGATIVE MG/DL
LEUKOCYTE ESTERASE, URINE: NEGATIVE
LYMPHOCYTES ABSOLUTE: 2 K/UL (ref 1.1–4.5)
LYMPHOCYTES RELATIVE PERCENT: 19.2 % (ref 20–40)
MCH RBC QN AUTO: 27.9 PG (ref 27–31)
MCHC RBC AUTO-ENTMCNC: 30.7 G/DL (ref 33–37)
MCV RBC AUTO: 91 FL (ref 81–99)
MONOCYTES ABSOLUTE: 0.7 K/UL (ref 0–0.9)
MONOCYTES RELATIVE PERCENT: 7.2 % (ref 0–10)
NEUTROPHILS ABSOLUTE: 7.3 K/UL (ref 1.5–7.5)
NEUTROPHILS RELATIVE PERCENT: 71.3 % (ref 50–65)
NITRITE, URINE: NEGATIVE
PDW BLD-RTO: 15.2 % (ref 11.5–14.5)
PH UA: 6
PLATELET # BLD: 288 K/UL (ref 130–400)
PMV BLD AUTO: 10.1 FL (ref 9.4–12.3)
POTASSIUM SERPL-SCNC: 4.3 MMOL/L (ref 3.5–5)
PROTEIN UA: NEGATIVE MG/DL
PROTHROMBIN TIME: 13 SEC (ref 12–14.6)
RBC # BLD: 4.33 M/UL (ref 4.2–5.4)
RBC UA: 3 /HPF (ref 0–4)
SEDIMENTATION RATE, ERYTHROCYTE: 23 MM/HR (ref 0–25)
SODIUM BLD-SCNC: 144 MMOL/L (ref 136–145)
SPECIFIC GRAVITY UA: 1.03
TOTAL PROTEIN: 7.1 G/DL (ref 6.6–8.7)
URINE REFLEX TO CULTURE: ABNORMAL
UROBILINOGEN, URINE: 0.2 E.U./DL
WBC # BLD: 10.3 K/UL (ref 4.8–10.8)
WBC UA: 2 /HPF (ref 0–5)

## 2019-01-10 PROCEDURE — 85025 COMPLETE CBC W/AUTO DIFF WBC: CPT

## 2019-01-10 PROCEDURE — 80053 COMPREHEN METABOLIC PANEL: CPT

## 2019-01-10 PROCEDURE — 81001 URINALYSIS AUTO W/SCOPE: CPT

## 2019-01-10 PROCEDURE — 83036 HEMOGLOBIN GLYCOSYLATED A1C: CPT

## 2019-01-10 PROCEDURE — 93005 ELECTROCARDIOGRAM TRACING: CPT

## 2019-01-10 PROCEDURE — 36415 COLL VENOUS BLD VENIPUNCTURE: CPT

## 2019-01-10 PROCEDURE — 85730 THROMBOPLASTIN TIME PARTIAL: CPT

## 2019-01-10 PROCEDURE — 85610 PROTHROMBIN TIME: CPT

## 2019-01-10 PROCEDURE — 87081 CULTURE SCREEN ONLY: CPT

## 2019-01-10 PROCEDURE — 86140 C-REACTIVE PROTEIN: CPT

## 2019-01-10 PROCEDURE — 85652 RBC SED RATE AUTOMATED: CPT

## 2019-01-10 PROCEDURE — 71046 X-RAY EXAM CHEST 2 VIEWS: CPT

## 2019-01-10 RX ORDER — CELECOXIB 200 MG/1
200 CAPSULE ORAL ONCE
Status: CANCELLED | OUTPATIENT
Start: 2019-02-01

## 2019-01-10 RX ORDER — MELOXICAM 15 MG/1
15 TABLET ORAL DAILY
Status: ON HOLD | COMMUNITY
End: 2019-02-04 | Stop reason: HOSPADM

## 2019-01-10 RX ORDER — PREGABALIN 75 MG/1
75 CAPSULE ORAL ONCE
Status: CANCELLED | OUTPATIENT
Start: 2019-02-01

## 2019-01-10 RX ORDER — ASCORBIC ACID 500 MG
500 TABLET ORAL DAILY
COMMUNITY

## 2019-01-10 RX ORDER — DEXAMETHASONE SODIUM PHOSPHATE 10 MG/ML
10 INJECTION INTRAMUSCULAR; INTRAVENOUS ONCE
Status: CANCELLED | OUTPATIENT
Start: 2019-02-01

## 2019-01-10 RX ORDER — CLONIDINE HYDROCHLORIDE 0.1 MG/1
0.1 TABLET ORAL DAILY
COMMUNITY

## 2019-01-10 RX ORDER — ACETAMINOPHEN 500 MG
1000 TABLET ORAL ONCE
Status: CANCELLED | OUTPATIENT
Start: 2019-02-01

## 2019-01-10 RX ORDER — CHLORAL HYDRATE 500 MG
1000 CAPSULE ORAL DAILY
COMMUNITY

## 2019-01-10 RX ORDER — IBUPROFEN 800 MG/1
800 TABLET ORAL EVERY 6 HOURS PRN
Status: ON HOLD | COMMUNITY
End: 2019-02-04 | Stop reason: HOSPADM

## 2019-01-10 RX ORDER — VANCOMYCIN HYDROCHLORIDE 1 G/200ML
1000 INJECTION, SOLUTION INTRAVENOUS ONCE
Status: CANCELLED | OUTPATIENT
Start: 2019-02-01

## 2019-01-10 RX ORDER — ROSUVASTATIN CALCIUM 20 MG/1
20 TABLET, COATED ORAL NIGHTLY
COMMUNITY

## 2019-01-11 LAB
EKG P AXIS: 56 DEGREES
EKG P-R INTERVAL: 134 MS
EKG Q-T INTERVAL: 364 MS
EKG QRS DURATION: 86 MS
EKG QTC CALCULATION (BAZETT): 397 MS
EKG T AXIS: 33 DEGREES

## 2019-01-12 LAB — MRSA CULTURE ONLY: NORMAL

## 2019-02-01 ENCOUNTER — ANESTHESIA (OUTPATIENT)
Dept: OPERATING ROOM | Age: 64
DRG: 467 | End: 2019-02-01
Payer: COMMERCIAL

## 2019-02-01 ENCOUNTER — HOSPITAL ENCOUNTER (INPATIENT)
Age: 64
LOS: 3 days | Discharge: HOME HEALTH | DRG: 467 | End: 2019-02-04
Attending: ORTHOPAEDIC SURGERY | Admitting: ORTHOPAEDIC SURGERY
Payer: COMMERCIAL

## 2019-02-01 ENCOUNTER — APPOINTMENT (OUTPATIENT)
Dept: GENERAL RADIOLOGY | Age: 64
DRG: 467 | End: 2019-02-01
Attending: ORTHOPAEDIC SURGERY
Payer: COMMERCIAL

## 2019-02-01 ENCOUNTER — ANESTHESIA EVENT (OUTPATIENT)
Dept: OPERATING ROOM | Age: 64
DRG: 467 | End: 2019-02-01
Payer: COMMERCIAL

## 2019-02-01 VITALS
RESPIRATION RATE: 26 BRPM | OXYGEN SATURATION: 100 % | TEMPERATURE: 97 F | SYSTOLIC BLOOD PRESSURE: 93 MMHG | DIASTOLIC BLOOD PRESSURE: 43 MMHG

## 2019-02-01 DIAGNOSIS — Z96.659 LOOSE TOTAL KNEE ARTHROPLASTY, SUBSEQUENT ENCOUNTER: Primary | ICD-10-CM

## 2019-02-01 DIAGNOSIS — T84.038D LOOSE TOTAL KNEE ARTHROPLASTY, SUBSEQUENT ENCOUNTER: Primary | ICD-10-CM

## 2019-02-01 PROBLEM — T84.038A LOOSE TOTAL KNEE ARTHROPLASTY (HCC): Status: ACTIVE | Noted: 2019-02-01

## 2019-02-01 LAB
ABO/RH: NORMAL
ANTIBODY SCREEN: NORMAL
GLUCOSE BLD-MCNC: 110 MG/DL (ref 70–99)
GLUCOSE BLD-MCNC: 185 MG/DL (ref 70–99)
HBA1C MFR BLD: 5.9 % (ref 4–6)
PERFORMED ON: ABNORMAL
PERFORMED ON: ABNORMAL

## 2019-02-01 PROCEDURE — 94664 DEMO&/EVAL PT USE INHALER: CPT

## 2019-02-01 PROCEDURE — C1713 ANCHOR/SCREW BN/BN,TIS/BN: HCPCS | Performed by: ORTHOPAEDIC SURGERY

## 2019-02-01 PROCEDURE — 87205 SMEAR GRAM STAIN: CPT

## 2019-02-01 PROCEDURE — 6360000002 HC RX W HCPCS: Performed by: ORTHOPAEDIC SURGERY

## 2019-02-01 PROCEDURE — 2580000003 HC RX 258: Performed by: ORTHOPAEDIC SURGERY

## 2019-02-01 PROCEDURE — 2709999900 HC NON-CHARGEABLE SUPPLY: Performed by: ORTHOPAEDIC SURGERY

## 2019-02-01 PROCEDURE — 6370000000 HC RX 637 (ALT 250 FOR IP): Performed by: ORTHOPAEDIC SURGERY

## 2019-02-01 PROCEDURE — 64447 NJX AA&/STRD FEMORAL NRV IMG: CPT | Performed by: NURSE ANESTHETIST, CERTIFIED REGISTERED

## 2019-02-01 PROCEDURE — C1776 JOINT DEVICE (IMPLANTABLE): HCPCS | Performed by: ORTHOPAEDIC SURGERY

## 2019-02-01 PROCEDURE — 3700000000 HC ANESTHESIA ATTENDED CARE: Performed by: ORTHOPAEDIC SURGERY

## 2019-02-01 PROCEDURE — 73560 X-RAY EXAM OF KNEE 1 OR 2: CPT

## 2019-02-01 PROCEDURE — 2720000010 HC SURG SUPPLY STERILE: Performed by: ORTHOPAEDIC SURGERY

## 2019-02-01 PROCEDURE — 0SPC0JZ REMOVAL OF SYNTHETIC SUBSTITUTE FROM RIGHT KNEE JOINT, OPEN APPROACH: ICD-10-PCS | Performed by: ORTHOPAEDIC SURGERY

## 2019-02-01 PROCEDURE — 2500000003 HC RX 250 WO HCPCS: Performed by: NURSE ANESTHETIST, CERTIFIED REGISTERED

## 2019-02-01 PROCEDURE — 86900 BLOOD TYPING SEROLOGIC ABO: CPT

## 2019-02-01 PROCEDURE — 86850 RBC ANTIBODY SCREEN: CPT

## 2019-02-01 PROCEDURE — 7100000000 HC PACU RECOVERY - FIRST 15 MIN: Performed by: ORTHOPAEDIC SURGERY

## 2019-02-01 PROCEDURE — 83036 HEMOGLOBIN GLYCOSYLATED A1C: CPT

## 2019-02-01 PROCEDURE — 3209999900 FLUORO FOR SURGICAL PROCEDURES

## 2019-02-01 PROCEDURE — 6360000002 HC RX W HCPCS: Performed by: ANESTHESIOLOGY

## 2019-02-01 PROCEDURE — 2720000001 HC MISC SURG SUPPLY STERILE $51-500: Performed by: ORTHOPAEDIC SURGERY

## 2019-02-01 PROCEDURE — 1210000000 HC MED SURG R&B

## 2019-02-01 PROCEDURE — 3700000001 HC ADD 15 MINUTES (ANESTHESIA): Performed by: ORTHOPAEDIC SURGERY

## 2019-02-01 PROCEDURE — 2700000000 HC OXYGEN THERAPY PER DAY

## 2019-02-01 PROCEDURE — 86901 BLOOD TYPING SEROLOGIC RH(D): CPT

## 2019-02-01 PROCEDURE — 82948 REAGENT STRIP/BLOOD GLUCOSE: CPT

## 2019-02-01 PROCEDURE — 2780000010 HC IMPLANT OTHER: Performed by: ORTHOPAEDIC SURGERY

## 2019-02-01 PROCEDURE — 7100000001 HC PACU RECOVERY - ADDTL 15 MIN: Performed by: ORTHOPAEDIC SURGERY

## 2019-02-01 PROCEDURE — 0SRC0J9 REPLACEMENT OF RIGHT KNEE JOINT WITH SYNTHETIC SUBSTITUTE, CEMENTED, OPEN APPROACH: ICD-10-PCS | Performed by: ORTHOPAEDIC SURGERY

## 2019-02-01 PROCEDURE — 3600000015 HC SURGERY LEVEL 5 ADDTL 15MIN: Performed by: ORTHOPAEDIC SURGERY

## 2019-02-01 PROCEDURE — 87070 CULTURE OTHR SPECIMN AEROBIC: CPT

## 2019-02-01 PROCEDURE — 87075 CULTR BACTERIA EXCEPT BLOOD: CPT

## 2019-02-01 PROCEDURE — 3E0T3BZ INTRODUCTION OF ANESTHETIC AGENT INTO PERIPHERAL NERVES AND PLEXI, PERCUTANEOUS APPROACH: ICD-10-PCS | Performed by: ANESTHESIOLOGY

## 2019-02-01 PROCEDURE — 3600000005 HC SURGERY LEVEL 5 BASE: Performed by: ORTHOPAEDIC SURGERY

## 2019-02-01 PROCEDURE — 36415 COLL VENOUS BLD VENIPUNCTURE: CPT

## 2019-02-01 PROCEDURE — 2500000003 HC RX 250 WO HCPCS: Performed by: ORTHOPAEDIC SURGERY

## 2019-02-01 PROCEDURE — 6360000002 HC RX W HCPCS: Performed by: NURSE ANESTHETIST, CERTIFIED REGISTERED

## 2019-02-01 PROCEDURE — C9290 INJ, BUPIVACAINE LIPOSOME: HCPCS | Performed by: ORTHOPAEDIC SURGERY

## 2019-02-01 DEVICE — SLEEVE FEM 37MM LATERAL/MEDIAL FULL COAT REV PORCOAT ATTUNE: Type: IMPLANTABLE DEVICE | Site: KNEE | Status: FUNCTIONAL

## 2019-02-01 DEVICE — AUGMENT FEM SZ 5 THK4MM DST KNEE CO CHROM MOLYBDENUM CEM: Type: IMPLANTABLE DEVICE | Site: KNEE | Status: FUNCTIONAL

## 2019-02-01 DEVICE — STEM FEM L110MM DIA14MM REV PRESSFIT ATTUNE: Type: IMPLANTABLE DEVICE | Site: KNEE | Status: FUNCTIONAL

## 2019-02-01 DEVICE — Z  INACTIVE USE 2750024 CEMENT BONE 40GM W/ GENTMYCN HI VISC PALACOS R+G: Type: IMPLANTABLE DEVICE | Site: KNEE | Status: FUNCTIONAL

## 2019-02-01 DEVICE — CABLE SURG DIA1.7MM S STL HA CERCLAGE W/ CRMP 29880101S] DEPUY SYNTHES USA]: Type: IMPLANTABLE DEVICE | Site: KNEE | Status: FUNCTIONAL

## 2019-02-01 DEVICE — AUGMENT FEM SZ 5 THK8MM POST KNEE CO CHROM MOLYBDENUM CEM: Type: IMPLANTABLE DEVICE | Site: KNEE | Status: FUNCTIONAL

## 2019-02-01 DEVICE — BASEPLATE TIB SZ 4 ROT PLATFRM CO CHROM MOLYBDENUM TI ALLY: Type: IMPLANTABLE DEVICE | Site: KNEE | Status: FUNCTIONAL

## 2019-02-01 DEVICE — IMPLANTABLE DEVICE: Type: IMPLANTABLE DEVICE | Site: KNEE | Status: FUNCTIONAL

## 2019-02-01 DEVICE — COMPONENT FEM SZ 5 R KNEE CO CHROM MOLYBDENUM CEM W/ ASYM: Type: IMPLANTABLE DEVICE | Site: KNEE | Status: FUNCTIONAL

## 2019-02-01 DEVICE — SLEEVE FEM THK35MM KNEE TI ALLY FULL POR COAT REV ATTUNE: Type: IMPLANTABLE DEVICE | Site: KNEE | Status: FUNCTIONAL

## 2019-02-01 RX ORDER — SODIUM CHLORIDE 0.9 % (FLUSH) 0.9 %
10 SYRINGE (ML) INJECTION PRN
Status: DISCONTINUED | OUTPATIENT
Start: 2019-02-01 | End: 2019-02-04 | Stop reason: HOSPADM

## 2019-02-01 RX ORDER — LABETALOL HYDROCHLORIDE 5 MG/ML
5 INJECTION, SOLUTION INTRAVENOUS EVERY 10 MIN PRN
Status: DISCONTINUED | OUTPATIENT
Start: 2019-02-01 | End: 2019-02-01 | Stop reason: HOSPADM

## 2019-02-01 RX ORDER — ROCURONIUM BROMIDE 10 MG/ML
INJECTION, SOLUTION INTRAVENOUS PRN
Status: DISCONTINUED | OUTPATIENT
Start: 2019-02-01 | End: 2019-02-01 | Stop reason: SDUPTHER

## 2019-02-01 RX ORDER — SODIUM CHLORIDE 0.9 % (FLUSH) 0.9 %
10 SYRINGE (ML) INJECTION PRN
Status: DISCONTINUED | OUTPATIENT
Start: 2019-02-01 | End: 2019-02-01 | Stop reason: HOSPADM

## 2019-02-01 RX ORDER — FENTANYL CITRATE 50 UG/ML
50 INJECTION, SOLUTION INTRAMUSCULAR; INTRAVENOUS
Status: DISCONTINUED | OUTPATIENT
Start: 2019-02-01 | End: 2019-02-01 | Stop reason: HOSPADM

## 2019-02-01 RX ORDER — ONDANSETRON 2 MG/ML
INJECTION INTRAMUSCULAR; INTRAVENOUS PRN
Status: DISCONTINUED | OUTPATIENT
Start: 2019-02-01 | End: 2019-02-01 | Stop reason: SDUPTHER

## 2019-02-01 RX ORDER — DIPHENHYDRAMINE HCL 25 MG
25 TABLET ORAL EVERY 6 HOURS PRN
Status: DISCONTINUED | OUTPATIENT
Start: 2019-02-01 | End: 2019-02-04 | Stop reason: HOSPADM

## 2019-02-01 RX ORDER — EPHEDRINE SULFATE 50 MG/ML
INJECTION, SOLUTION INTRAVENOUS PRN
Status: DISCONTINUED | OUTPATIENT
Start: 2019-02-01 | End: 2019-02-01 | Stop reason: SDUPTHER

## 2019-02-01 RX ORDER — VANCOMYCIN HYDROCHLORIDE 1 G/200ML
1000 INJECTION, SOLUTION INTRAVENOUS EVERY 12 HOURS
Status: DISCONTINUED | OUTPATIENT
Start: 2019-02-01 | End: 2019-02-01 | Stop reason: DRUGHIGH

## 2019-02-01 RX ORDER — DOCUSATE SODIUM 100 MG/1
100 CAPSULE, LIQUID FILLED ORAL 2 TIMES DAILY
Status: DISCONTINUED | OUTPATIENT
Start: 2019-02-01 | End: 2019-02-04 | Stop reason: HOSPADM

## 2019-02-01 RX ORDER — FENTANYL CITRATE 50 UG/ML
25 INJECTION, SOLUTION INTRAMUSCULAR; INTRAVENOUS
Status: DISCONTINUED | OUTPATIENT
Start: 2019-02-01 | End: 2019-02-01 | Stop reason: HOSPADM

## 2019-02-01 RX ORDER — SODIUM CHLORIDE 0.9 % (FLUSH) 0.9 %
10 SYRINGE (ML) INJECTION EVERY 12 HOURS SCHEDULED
Status: DISCONTINUED | OUTPATIENT
Start: 2019-02-01 | End: 2019-02-01 | Stop reason: HOSPADM

## 2019-02-01 RX ORDER — DEXAMETHASONE SODIUM PHOSPHATE 10 MG/ML
10 INJECTION INTRAMUSCULAR; INTRAVENOUS ONCE
Status: COMPLETED | OUTPATIENT
Start: 2019-02-01 | End: 2019-02-01

## 2019-02-01 RX ORDER — DIPHENHYDRAMINE HYDROCHLORIDE 50 MG/ML
12.5 INJECTION INTRAMUSCULAR; INTRAVENOUS
Status: DISCONTINUED | OUTPATIENT
Start: 2019-02-01 | End: 2019-02-01 | Stop reason: HOSPADM

## 2019-02-01 RX ORDER — PREGABALIN 75 MG/1
75 CAPSULE ORAL ONCE
Status: COMPLETED | OUTPATIENT
Start: 2019-02-01 | End: 2019-02-01

## 2019-02-01 RX ORDER — M-VIT,TX,IRON,MINS/CALC/FOLIC 27MG-0.4MG
1 TABLET ORAL DAILY
Status: DISCONTINUED | OUTPATIENT
Start: 2019-02-01 | End: 2019-02-04 | Stop reason: HOSPADM

## 2019-02-01 RX ORDER — ROSUVASTATIN CALCIUM 10 MG/1
20 TABLET, COATED ORAL NIGHTLY
Status: DISCONTINUED | OUTPATIENT
Start: 2019-02-01 | End: 2019-02-04 | Stop reason: HOSPADM

## 2019-02-01 RX ORDER — LANOLIN ALCOHOL/MO/W.PET/CERES
400 CREAM (GRAM) TOPICAL DAILY
Status: DISCONTINUED | OUTPATIENT
Start: 2019-02-01 | End: 2019-02-04 | Stop reason: HOSPADM

## 2019-02-01 RX ORDER — SODIUM CHLORIDE, SODIUM LACTATE, POTASSIUM CHLORIDE, CALCIUM CHLORIDE 600; 310; 30; 20 MG/100ML; MG/100ML; MG/100ML; MG/100ML
INJECTION, SOLUTION INTRAVENOUS CONTINUOUS
Status: DISCONTINUED | OUTPATIENT
Start: 2019-02-01 | End: 2019-02-01

## 2019-02-01 RX ORDER — ROPIVACAINE HYDROCHLORIDE 5 MG/ML
INJECTION, SOLUTION EPIDURAL; INFILTRATION; PERINEURAL PRN
Status: DISCONTINUED | OUTPATIENT
Start: 2019-02-01 | End: 2019-02-01 | Stop reason: SDUPTHER

## 2019-02-01 RX ORDER — PLANT STANOL ESTER 450 MG
550 TABLET ORAL DAILY
Status: DISCONTINUED | OUTPATIENT
Start: 2019-02-01 | End: 2019-02-04 | Stop reason: HOSPADM

## 2019-02-01 RX ORDER — OXYCODONE HCL 10 MG/1
10 TABLET, FILM COATED, EXTENDED RELEASE ORAL EVERY 12 HOURS SCHEDULED
Status: DISCONTINUED | OUTPATIENT
Start: 2019-02-01 | End: 2019-02-04 | Stop reason: HOSPADM

## 2019-02-01 RX ORDER — METOCLOPRAMIDE HYDROCHLORIDE 5 MG/ML
10 INJECTION INTRAMUSCULAR; INTRAVENOUS
Status: DISCONTINUED | OUTPATIENT
Start: 2019-02-01 | End: 2019-02-01 | Stop reason: HOSPADM

## 2019-02-01 RX ORDER — CELECOXIB 200 MG/1
200 CAPSULE ORAL ONCE
Status: COMPLETED | OUTPATIENT
Start: 2019-02-01 | End: 2019-02-01

## 2019-02-01 RX ORDER — MORPHINE SULFATE/0.9% NACL/PF 1 MG/ML
2 SYRINGE (ML) INJECTION EVERY 5 MIN PRN
Status: DISCONTINUED | OUTPATIENT
Start: 2019-02-01 | End: 2019-02-01 | Stop reason: HOSPADM

## 2019-02-01 RX ORDER — ACETAMINOPHEN 500 MG
1000 TABLET ORAL EVERY 8 HOURS
Status: DISCONTINUED | OUTPATIENT
Start: 2019-02-01 | End: 2019-02-04 | Stop reason: HOSPADM

## 2019-02-01 RX ORDER — TRANEXAMIC ACID 100 MG/ML
INJECTION, SOLUTION INTRAVENOUS PRN
Status: DISCONTINUED | OUTPATIENT
Start: 2019-02-01 | End: 2019-02-01 | Stop reason: SDUPTHER

## 2019-02-01 RX ORDER — VANCOMYCIN HYDROCHLORIDE 1 G/200ML
1000 INJECTION, SOLUTION INTRAVENOUS ONCE
Status: COMPLETED | OUTPATIENT
Start: 2019-02-01 | End: 2019-02-01

## 2019-02-01 RX ORDER — SODIUM CHLORIDE 9 MG/ML
INJECTION, SOLUTION INTRAVENOUS CONTINUOUS
Status: DISCONTINUED | OUTPATIENT
Start: 2019-02-01 | End: 2019-02-04 | Stop reason: HOSPADM

## 2019-02-01 RX ORDER — NICOTINE POLACRILEX 4 MG
15 LOZENGE BUCCAL PRN
Status: DISCONTINUED | OUTPATIENT
Start: 2019-02-01 | End: 2019-02-04 | Stop reason: HOSPADM

## 2019-02-01 RX ORDER — OXYCODONE HYDROCHLORIDE 5 MG/1
20 TABLET ORAL EVERY 4 HOURS PRN
Status: DISCONTINUED | OUTPATIENT
Start: 2019-02-01 | End: 2019-02-04 | Stop reason: HOSPADM

## 2019-02-01 RX ORDER — TRAMADOL HYDROCHLORIDE 50 MG/1
100 TABLET ORAL EVERY 6 HOURS
Status: DISCONTINUED | OUTPATIENT
Start: 2019-02-01 | End: 2019-02-01 | Stop reason: DRUGHIGH

## 2019-02-01 RX ORDER — TRAMADOL HYDROCHLORIDE 50 MG/1
50 TABLET ORAL EVERY 6 HOURS SCHEDULED
Status: DISCONTINUED | OUTPATIENT
Start: 2019-02-01 | End: 2019-02-04 | Stop reason: HOSPADM

## 2019-02-01 RX ORDER — ENALAPRILAT 2.5 MG/2ML
1.25 INJECTION INTRAVENOUS
Status: DISCONTINUED | OUTPATIENT
Start: 2019-02-01 | End: 2019-02-01 | Stop reason: HOSPADM

## 2019-02-01 RX ORDER — MIDAZOLAM HYDROCHLORIDE 1 MG/ML
2 INJECTION INTRAMUSCULAR; INTRAVENOUS
Status: COMPLETED | OUTPATIENT
Start: 2019-02-01 | End: 2019-02-01

## 2019-02-01 RX ORDER — BUPIVACAINE HYDROCHLORIDE 2.5 MG/ML
INJECTION, SOLUTION INFILTRATION; PERINEURAL PRN
Status: DISCONTINUED | OUTPATIENT
Start: 2019-02-01 | End: 2019-02-01 | Stop reason: HOSPADM

## 2019-02-01 RX ORDER — LIDOCAINE HYDROCHLORIDE 10 MG/ML
INJECTION, SOLUTION INFILTRATION; PERINEURAL PRN
Status: DISCONTINUED | OUTPATIENT
Start: 2019-02-01 | End: 2019-02-01 | Stop reason: SDUPTHER

## 2019-02-01 RX ORDER — 0.9 % SODIUM CHLORIDE 0.9 %
500 INTRAVENOUS SOLUTION INTRAVENOUS PRN
Status: DISCONTINUED | OUTPATIENT
Start: 2019-02-01 | End: 2019-02-04 | Stop reason: HOSPADM

## 2019-02-01 RX ORDER — PROMETHAZINE HYDROCHLORIDE 25 MG/ML
6.25 INJECTION, SOLUTION INTRAMUSCULAR; INTRAVENOUS
Status: DISCONTINUED | OUTPATIENT
Start: 2019-02-01 | End: 2019-02-01 | Stop reason: HOSPADM

## 2019-02-01 RX ORDER — MEPERIDINE HYDROCHLORIDE 50 MG/ML
12.5 INJECTION INTRAMUSCULAR; INTRAVENOUS; SUBCUTANEOUS EVERY 5 MIN PRN
Status: DISCONTINUED | OUTPATIENT
Start: 2019-02-01 | End: 2019-02-01 | Stop reason: HOSPADM

## 2019-02-01 RX ORDER — MORPHINE SULFATE/0.9% NACL/PF 1 MG/ML
4 SYRINGE (ML) INJECTION EVERY 5 MIN PRN
Status: DISCONTINUED | OUTPATIENT
Start: 2019-02-01 | End: 2019-02-01 | Stop reason: HOSPADM

## 2019-02-01 RX ORDER — HYDRALAZINE HYDROCHLORIDE 20 MG/ML
5 INJECTION INTRAMUSCULAR; INTRAVENOUS EVERY 10 MIN PRN
Status: DISCONTINUED | OUTPATIENT
Start: 2019-02-01 | End: 2019-02-01 | Stop reason: HOSPADM

## 2019-02-01 RX ORDER — SODIUM CHLORIDE 0.9 % (FLUSH) 0.9 %
10 SYRINGE (ML) INJECTION EVERY 12 HOURS SCHEDULED
Status: DISCONTINUED | OUTPATIENT
Start: 2019-02-01 | End: 2019-02-04 | Stop reason: HOSPADM

## 2019-02-01 RX ORDER — ASCORBIC ACID 500 MG
500 TABLET ORAL DAILY
Status: DISCONTINUED | OUTPATIENT
Start: 2019-02-01 | End: 2019-02-04 | Stop reason: HOSPADM

## 2019-02-01 RX ORDER — ACETAMINOPHEN 500 MG
1000 TABLET ORAL ONCE
Status: COMPLETED | OUTPATIENT
Start: 2019-02-01 | End: 2019-02-01

## 2019-02-01 RX ORDER — DEXTROSE MONOHYDRATE 25 G/50ML
12.5 INJECTION, SOLUTION INTRAVENOUS PRN
Status: DISCONTINUED | OUTPATIENT
Start: 2019-02-01 | End: 2019-02-04 | Stop reason: HOSPADM

## 2019-02-01 RX ORDER — PROPOFOL 10 MG/ML
INJECTION, EMULSION INTRAVENOUS PRN
Status: DISCONTINUED | OUTPATIENT
Start: 2019-02-01 | End: 2019-02-01 | Stop reason: SDUPTHER

## 2019-02-01 RX ORDER — ONDANSETRON 2 MG/ML
4 INJECTION INTRAMUSCULAR; INTRAVENOUS EVERY 6 HOURS PRN
Status: DISCONTINUED | OUTPATIENT
Start: 2019-02-01 | End: 2019-02-04 | Stop reason: HOSPADM

## 2019-02-01 RX ORDER — OXYCODONE HYDROCHLORIDE 5 MG/1
5 TABLET ORAL EVERY 4 HOURS PRN
Status: DISCONTINUED | OUTPATIENT
Start: 2019-02-01 | End: 2019-02-04 | Stop reason: HOSPADM

## 2019-02-01 RX ORDER — FENTANYL CITRATE 50 UG/ML
INJECTION, SOLUTION INTRAMUSCULAR; INTRAVENOUS PRN
Status: DISCONTINUED | OUTPATIENT
Start: 2019-02-01 | End: 2019-02-01 | Stop reason: SDUPTHER

## 2019-02-01 RX ORDER — DEXTROSE MONOHYDRATE 50 MG/ML
100 INJECTION, SOLUTION INTRAVENOUS PRN
Status: DISCONTINUED | OUTPATIENT
Start: 2019-02-01 | End: 2019-02-04 | Stop reason: HOSPADM

## 2019-02-01 RX ORDER — BISACODYL 10 MG
10 SUPPOSITORY, RECTAL RECTAL DAILY PRN
Status: DISCONTINUED | OUTPATIENT
Start: 2019-02-01 | End: 2019-02-04 | Stop reason: HOSPADM

## 2019-02-01 RX ORDER — OXYCODONE HYDROCHLORIDE 5 MG/1
10 TABLET ORAL EVERY 4 HOURS PRN
Status: DISCONTINUED | OUTPATIENT
Start: 2019-02-01 | End: 2019-02-04 | Stop reason: HOSPADM

## 2019-02-01 RX ORDER — LIDOCAINE HYDROCHLORIDE 10 MG/ML
1 INJECTION, SOLUTION EPIDURAL; INFILTRATION; INTRACAUDAL; PERINEURAL
Status: DISCONTINUED | OUTPATIENT
Start: 2019-02-01 | End: 2019-02-01 | Stop reason: HOSPADM

## 2019-02-01 RX ORDER — CLONIDINE HYDROCHLORIDE 0.1 MG/1
0.1 TABLET ORAL DAILY
Status: DISCONTINUED | OUTPATIENT
Start: 2019-02-02 | End: 2019-02-04 | Stop reason: HOSPADM

## 2019-02-01 RX ADMIN — PROPOFOL 150 MG: 10 INJECTION, EMULSION INTRAVENOUS at 11:53

## 2019-02-01 RX ADMIN — SODIUM CHLORIDE, SODIUM LACTATE, POTASSIUM CHLORIDE, AND CALCIUM CHLORIDE: 600; 310; 30; 20 INJECTION, SOLUTION INTRAVENOUS at 12:28

## 2019-02-01 RX ADMIN — PHENYLEPHRINE HYDROCHLORIDE 100 MCG: 10 INJECTION INTRAVENOUS at 12:08

## 2019-02-01 RX ADMIN — HYDROMORPHONE HYDROCHLORIDE 0.5 MG: 1 INJECTION, SOLUTION INTRAMUSCULAR; INTRAVENOUS; SUBCUTANEOUS at 13:40

## 2019-02-01 RX ADMIN — ONDANSETRON HYDROCHLORIDE 4 MG: 2 INJECTION, SOLUTION INTRAMUSCULAR; INTRAVENOUS at 15:59

## 2019-02-01 RX ADMIN — LINAGLIPTIN 5 MG: 5 TABLET, FILM COATED ORAL at 21:07

## 2019-02-01 RX ADMIN — VANCOMYCIN HYDROCHLORIDE 1000 MG: 1 INJECTION, SOLUTION INTRAVENOUS at 09:08

## 2019-02-01 RX ADMIN — VANCOMYCIN HYDROCHLORIDE 1500 MG: 10 INJECTION, POWDER, LYOPHILIZED, FOR SOLUTION INTRAVENOUS at 21:10

## 2019-02-01 RX ADMIN — SODIUM CHLORIDE, SODIUM LACTATE, POTASSIUM CHLORIDE, AND CALCIUM CHLORIDE: 600; 310; 30; 20 INJECTION, SOLUTION INTRAVENOUS at 13:14

## 2019-02-01 RX ADMIN — ACETAMINOPHEN 1000 MG: 500 TABLET, FILM COATED ORAL at 21:06

## 2019-02-01 RX ADMIN — HYDROMORPHONE HYDROCHLORIDE 0.5 MG: 1 INJECTION, SOLUTION INTRAMUSCULAR; INTRAVENOUS; SUBCUTANEOUS at 16:27

## 2019-02-01 RX ADMIN — LIDOCAINE HYDROCHLORIDE 50 MG: 10 INJECTION, SOLUTION INFILTRATION; PERINEURAL at 11:53

## 2019-02-01 RX ADMIN — TRANEXAMIC ACID 1000 MG: 100 INJECTION, SOLUTION INTRAVENOUS at 12:03

## 2019-02-01 RX ADMIN — ACETAMINOPHEN 1000 MG: 500 TABLET, FILM COATED ORAL at 09:08

## 2019-02-01 RX ADMIN — HYDROMORPHONE HYDROCHLORIDE 0.5 MG: 1 INJECTION, SOLUTION INTRAMUSCULAR; INTRAVENOUS; SUBCUTANEOUS at 16:10

## 2019-02-01 RX ADMIN — OXYCODONE HYDROCHLORIDE 10 MG: 10 TABLET, FILM COATED, EXTENDED RELEASE ORAL at 21:07

## 2019-02-01 RX ADMIN — TRANEXAMIC ACID 1000 MG: 100 INJECTION, SOLUTION INTRAVENOUS at 15:53

## 2019-02-01 RX ADMIN — PHENYLEPHRINE HYDROCHLORIDE 200 MCG: 10 INJECTION INTRAVENOUS at 12:39

## 2019-02-01 RX ADMIN — METFORMIN HYDROCHLORIDE 500 MG: 500 TABLET ORAL at 21:08

## 2019-02-01 RX ADMIN — EPHEDRINE SULFATE 10 MG: 50 INJECTION, SOLUTION INTRAMUSCULAR; INTRAVENOUS; SUBCUTANEOUS at 12:15

## 2019-02-01 RX ADMIN — ROSUVASTATIN CALCIUM 20 MG: 10 TABLET, FILM COATED ORAL at 21:08

## 2019-02-01 RX ADMIN — Medication 400 MG: at 21:07

## 2019-02-01 RX ADMIN — ONDANSETRON 4 MG: 2 INJECTION INTRAMUSCULAR; INTRAVENOUS at 23:23

## 2019-02-01 RX ADMIN — PHENYLEPHRINE HYDROCHLORIDE 100 MCG: 10 INJECTION INTRAVENOUS at 12:01

## 2019-02-01 RX ADMIN — OXYCODONE HYDROCHLORIDE AND ACETAMINOPHEN 500 MG: 500 TABLET ORAL at 21:07

## 2019-02-01 RX ADMIN — SODIUM CHLORIDE, SODIUM LACTATE, POTASSIUM CHLORIDE, AND CALCIUM CHLORIDE: 600; 310; 30; 20 INJECTION, SOLUTION INTRAVENOUS at 15:33

## 2019-02-01 RX ADMIN — EPHEDRINE SULFATE 10 MG: 50 INJECTION, SOLUTION INTRAMUSCULAR; INTRAVENOUS; SUBCUTANEOUS at 12:19

## 2019-02-01 RX ADMIN — MULTIPLE VITAMINS W/ MINERALS TAB 1 TABLET: TAB at 21:08

## 2019-02-01 RX ADMIN — ROPIVACAINE HYDROCHLORIDE 20 ML: 5 INJECTION, SOLUTION EPIDURAL; INFILTRATION; PERINEURAL at 10:29

## 2019-02-01 RX ADMIN — CEFEPIME 2 G: 2 INJECTION, POWDER, FOR SOLUTION INTRAMUSCULAR; INTRAVENOUS at 12:02

## 2019-02-01 RX ADMIN — ROCURONIUM BROMIDE 40 MG: 10 INJECTION INTRAVENOUS at 11:53

## 2019-02-01 RX ADMIN — Medication 550 MG: at 21:07

## 2019-02-01 RX ADMIN — FENTANYL CITRATE 100 MCG: 50 INJECTION INTRAMUSCULAR; INTRAVENOUS at 12:55

## 2019-02-01 RX ADMIN — SODIUM CHLORIDE, SODIUM LACTATE, POTASSIUM CHLORIDE, AND CALCIUM CHLORIDE: 600; 310; 30; 20 INJECTION, SOLUTION INTRAVENOUS at 09:08

## 2019-02-01 RX ADMIN — PHENYLEPHRINE HYDROCHLORIDE 200 MCG: 10 INJECTION INTRAVENOUS at 13:01

## 2019-02-01 RX ADMIN — PREGABALIN 75 MG: 75 CAPSULE ORAL at 09:08

## 2019-02-01 RX ADMIN — CELECOXIB 200 MG: 200 CAPSULE ORAL at 09:08

## 2019-02-01 RX ADMIN — HYDROMORPHONE HYDROCHLORIDE 0.5 MG: 1 INJECTION, SOLUTION INTRAMUSCULAR; INTRAVENOUS; SUBCUTANEOUS at 14:25

## 2019-02-01 RX ADMIN — CHOLECALCIFEROL CAP 125 MCG (5000 UNIT) 5000 UNITS: 125 CAP at 21:08

## 2019-02-01 RX ADMIN — MIDAZOLAM HYDROCHLORIDE 2 MG: 2 INJECTION, SOLUTION INTRAMUSCULAR; INTRAVENOUS at 10:24

## 2019-02-01 RX ADMIN — SODIUM CHLORIDE: 9 INJECTION, SOLUTION INTRAVENOUS at 18:25

## 2019-02-01 RX ADMIN — TRAMADOL HYDROCHLORIDE 50 MG: 50 TABLET, FILM COATED ORAL at 21:07

## 2019-02-01 RX ADMIN — DEXAMETHASONE SODIUM PHOSPHATE 10 MG: 10 INJECTION INTRAMUSCULAR; INTRAVENOUS at 12:10

## 2019-02-01 RX ADMIN — PHENYLEPHRINE HYDROCHLORIDE 100 MCG: 10 INJECTION INTRAVENOUS at 12:19

## 2019-02-01 RX ADMIN — FENTANYL CITRATE 150 MCG: 50 INJECTION INTRAMUSCULAR; INTRAVENOUS at 11:53

## 2019-02-01 RX ADMIN — PHENYLEPHRINE HYDROCHLORIDE 200 MCG: 10 INJECTION INTRAVENOUS at 12:12

## 2019-02-01 RX ADMIN — DOCUSATE SODIUM 100 MG: 100 CAPSULE, LIQUID FILLED ORAL at 21:08

## 2019-02-01 RX ADMIN — Medication 10 ML: at 21:12

## 2019-02-01 ASSESSMENT — PAIN SCALES - GENERAL
PAINLEVEL_OUTOF10: 0
PAINLEVEL_OUTOF10: 10
PAINLEVEL_OUTOF10: 0
PAINLEVEL_OUTOF10: 0

## 2019-02-01 ASSESSMENT — LIFESTYLE VARIABLES: SMOKING_STATUS: 0

## 2019-02-02 LAB
ANION GAP SERPL CALCULATED.3IONS-SCNC: 11 MMOL/L (ref 7–19)
BUN BLDV-MCNC: 11 MG/DL (ref 8–23)
CALCIUM SERPL-MCNC: 8.9 MG/DL (ref 8.8–10.2)
CHLORIDE BLD-SCNC: 102 MMOL/L (ref 98–111)
CO2: 24 MMOL/L (ref 22–29)
CREAT SERPL-MCNC: 0.5 MG/DL (ref 0.5–0.9)
GFR NON-AFRICAN AMERICAN: >60
GLUCOSE BLD-MCNC: 108 MG/DL (ref 70–99)
GLUCOSE BLD-MCNC: 131 MG/DL (ref 70–99)
GLUCOSE BLD-MCNC: 144 MG/DL (ref 70–99)
GLUCOSE BLD-MCNC: 169 MG/DL (ref 74–109)
GLUCOSE BLD-MCNC: 200 MG/DL (ref 70–99)
HCT VFR BLD CALC: 30.5 % (ref 37–47)
HEMOGLOBIN: 9.4 G/DL (ref 12–16)
PERFORMED ON: ABNORMAL
POTASSIUM REFLEX MAGNESIUM: 4.3 MMOL/L (ref 3.5–5)
SODIUM BLD-SCNC: 137 MMOL/L (ref 136–145)

## 2019-02-02 PROCEDURE — 85014 HEMATOCRIT: CPT

## 2019-02-02 PROCEDURE — 36415 COLL VENOUS BLD VENIPUNCTURE: CPT

## 2019-02-02 PROCEDURE — 97116 GAIT TRAINING THERAPY: CPT

## 2019-02-02 PROCEDURE — 85018 HEMOGLOBIN: CPT

## 2019-02-02 PROCEDURE — 80048 BASIC METABOLIC PNL TOTAL CA: CPT

## 2019-02-02 PROCEDURE — 82948 REAGENT STRIP/BLOOD GLUCOSE: CPT

## 2019-02-02 PROCEDURE — 97161 PT EVAL LOW COMPLEX 20 MIN: CPT

## 2019-02-02 PROCEDURE — 1210000000 HC MED SURG R&B

## 2019-02-02 PROCEDURE — 2580000003 HC RX 258: Performed by: ORTHOPAEDIC SURGERY

## 2019-02-02 PROCEDURE — 6370000000 HC RX 637 (ALT 250 FOR IP): Performed by: ORTHOPAEDIC SURGERY

## 2019-02-02 PROCEDURE — 6360000002 HC RX W HCPCS: Performed by: ORTHOPAEDIC SURGERY

## 2019-02-02 RX ADMIN — OXYCODONE HYDROCHLORIDE 10 MG: 10 TABLET, FILM COATED, EXTENDED RELEASE ORAL at 10:39

## 2019-02-02 RX ADMIN — OXYCODONE HYDROCHLORIDE 10 MG: 5 TABLET ORAL at 02:22

## 2019-02-02 RX ADMIN — METFORMIN HYDROCHLORIDE 500 MG: 500 TABLET ORAL at 17:33

## 2019-02-02 RX ADMIN — OXYCODONE HYDROCHLORIDE AND ACETAMINOPHEN 500 MG: 500 TABLET ORAL at 10:39

## 2019-02-02 RX ADMIN — LINAGLIPTIN 5 MG: 5 TABLET, FILM COATED ORAL at 10:39

## 2019-02-02 RX ADMIN — Medication 550 MG: at 10:39

## 2019-02-02 RX ADMIN — TRAMADOL HYDROCHLORIDE 50 MG: 50 TABLET, FILM COATED ORAL at 22:21

## 2019-02-02 RX ADMIN — ACETAMINOPHEN 1000 MG: 500 TABLET, FILM COATED ORAL at 02:23

## 2019-02-02 RX ADMIN — OXYCODONE HYDROCHLORIDE 10 MG: 10 TABLET, FILM COATED, EXTENDED RELEASE ORAL at 22:21

## 2019-02-02 RX ADMIN — OXYCODONE HYDROCHLORIDE 10 MG: 5 TABLET ORAL at 12:30

## 2019-02-02 RX ADMIN — TRAMADOL HYDROCHLORIDE 50 MG: 50 TABLET, FILM COATED ORAL at 10:39

## 2019-02-02 RX ADMIN — DOCUSATE SODIUM 100 MG: 100 CAPSULE, LIQUID FILLED ORAL at 22:21

## 2019-02-02 RX ADMIN — DOCUSATE SODIUM 100 MG: 100 CAPSULE, LIQUID FILLED ORAL at 10:39

## 2019-02-02 RX ADMIN — VANCOMYCIN HYDROCHLORIDE 1500 MG: 10 INJECTION, POWDER, LYOPHILIZED, FOR SOLUTION INTRAVENOUS at 05:21

## 2019-02-02 RX ADMIN — MULTIPLE VITAMINS W/ MINERALS TAB 1 TABLET: TAB at 10:39

## 2019-02-02 RX ADMIN — OXYCODONE HYDROCHLORIDE 10 MG: 5 TABLET ORAL at 07:35

## 2019-02-02 RX ADMIN — CEFEPIME HYDROCHLORIDE 2 G: 2 INJECTION, POWDER, FOR SOLUTION INTRAVENOUS at 12:31

## 2019-02-02 RX ADMIN — Medication 400 MG: at 10:39

## 2019-02-02 RX ADMIN — VANCOMYCIN HYDROCHLORIDE 1500 MG: 10 INJECTION, POWDER, LYOPHILIZED, FOR SOLUTION INTRAVENOUS at 18:21

## 2019-02-02 RX ADMIN — TRAMADOL HYDROCHLORIDE 50 MG: 50 TABLET, FILM COATED ORAL at 02:23

## 2019-02-02 RX ADMIN — OXYCODONE HYDROCHLORIDE 10 MG: 5 TABLET ORAL at 23:47

## 2019-02-02 RX ADMIN — CEFEPIME HYDROCHLORIDE 2 G: 2 INJECTION, POWDER, FOR SOLUTION INTRAVENOUS at 00:30

## 2019-02-02 RX ADMIN — CEFEPIME HYDROCHLORIDE 2 G: 2 INJECTION, POWDER, FOR SOLUTION INTRAVENOUS at 22:20

## 2019-02-02 RX ADMIN — Medication 10 ML: at 22:19

## 2019-02-02 RX ADMIN — ROSUVASTATIN CALCIUM 20 MG: 10 TABLET, FILM COATED ORAL at 22:20

## 2019-02-02 RX ADMIN — OXYCODONE HYDROCHLORIDE 10 MG: 5 TABLET ORAL at 17:33

## 2019-02-02 RX ADMIN — CHOLECALCIFEROL CAP 125 MCG (5000 UNIT) 5000 UNITS: 125 CAP at 10:39

## 2019-02-02 RX ADMIN — RIVAROXABAN 10 MG: 10 TABLET, FILM COATED ORAL at 17:33

## 2019-02-02 RX ADMIN — RIVAROXABAN 10 MG: 10 TABLET, FILM COATED ORAL at 00:00

## 2019-02-02 RX ADMIN — ACETAMINOPHEN 1000 MG: 500 TABLET, FILM COATED ORAL at 10:40

## 2019-02-02 RX ADMIN — METFORMIN HYDROCHLORIDE 500 MG: 500 TABLET ORAL at 07:36

## 2019-02-02 ASSESSMENT — PAIN SCALES - GENERAL
PAINLEVEL_OUTOF10: 0
PAINLEVEL_OUTOF10: 7
PAINLEVEL_OUTOF10: 0
PAINLEVEL_OUTOF10: 0
PAINLEVEL_OUTOF10: 8
PAINLEVEL_OUTOF10: 9
PAINLEVEL_OUTOF10: 9
PAINLEVEL_OUTOF10: 6
PAINLEVEL_OUTOF10: 9
PAINLEVEL_OUTOF10: 7

## 2019-02-02 ASSESSMENT — ENCOUNTER SYMPTOMS
GASTROINTESTINAL NEGATIVE: 1
RESPIRATORY NEGATIVE: 1

## 2019-02-02 ASSESSMENT — PAIN DESCRIPTION - PAIN TYPE: TYPE: ACUTE PAIN

## 2019-02-02 ASSESSMENT — PAIN SCALES - WONG BAKER: WONGBAKER_NUMERICALRESPONSE: 2

## 2019-02-02 ASSESSMENT — PAIN DESCRIPTION - LOCATION: LOCATION: HIP

## 2019-02-02 ASSESSMENT — PAIN DESCRIPTION - ORIENTATION: ORIENTATION: RIGHT

## 2019-02-03 LAB
ANION GAP SERPL CALCULATED.3IONS-SCNC: 12 MMOL/L (ref 7–19)
BUN BLDV-MCNC: 10 MG/DL (ref 8–23)
CALCIUM SERPL-MCNC: 8.9 MG/DL (ref 8.8–10.2)
CHLORIDE BLD-SCNC: 100 MMOL/L (ref 98–111)
CO2: 23 MMOL/L (ref 22–29)
CREAT SERPL-MCNC: 0.6 MG/DL (ref 0.5–0.9)
GFR NON-AFRICAN AMERICAN: >60
GLUCOSE BLD-MCNC: 116 MG/DL (ref 70–99)
GLUCOSE BLD-MCNC: 125 MG/DL (ref 70–99)
GLUCOSE BLD-MCNC: 127 MG/DL (ref 70–99)
GLUCOSE BLD-MCNC: 166 MG/DL (ref 70–99)
GLUCOSE BLD-MCNC: 193 MG/DL (ref 74–109)
HCT VFR BLD CALC: 28.5 % (ref 37–47)
HEMOGLOBIN: 8.8 G/DL (ref 12–16)
PERFORMED ON: ABNORMAL
POTASSIUM REFLEX MAGNESIUM: 3.7 MMOL/L (ref 3.5–5)
SODIUM BLD-SCNC: 135 MMOL/L (ref 136–145)

## 2019-02-03 PROCEDURE — 2580000003 HC RX 258: Performed by: ORTHOPAEDIC SURGERY

## 2019-02-03 PROCEDURE — 1210000000 HC MED SURG R&B

## 2019-02-03 PROCEDURE — 82948 REAGENT STRIP/BLOOD GLUCOSE: CPT

## 2019-02-03 PROCEDURE — 85018 HEMOGLOBIN: CPT

## 2019-02-03 PROCEDURE — 80048 BASIC METABOLIC PNL TOTAL CA: CPT

## 2019-02-03 PROCEDURE — 85014 HEMATOCRIT: CPT

## 2019-02-03 PROCEDURE — 36415 COLL VENOUS BLD VENIPUNCTURE: CPT

## 2019-02-03 PROCEDURE — 6370000000 HC RX 637 (ALT 250 FOR IP): Performed by: ORTHOPAEDIC SURGERY

## 2019-02-03 PROCEDURE — 97116 GAIT TRAINING THERAPY: CPT

## 2019-02-03 PROCEDURE — 6360000002 HC RX W HCPCS: Performed by: ORTHOPAEDIC SURGERY

## 2019-02-03 RX ORDER — FLUCONAZOLE 100 MG/1
150 TABLET ORAL DAILY
Status: DISCONTINUED | OUTPATIENT
Start: 2019-02-04 | End: 2019-02-04 | Stop reason: HOSPADM

## 2019-02-03 RX ADMIN — Medication 550 MG: at 09:36

## 2019-02-03 RX ADMIN — DOCUSATE SODIUM 100 MG: 100 CAPSULE, LIQUID FILLED ORAL at 21:23

## 2019-02-03 RX ADMIN — METFORMIN HYDROCHLORIDE 500 MG: 500 TABLET ORAL at 09:35

## 2019-02-03 RX ADMIN — OXYCODONE HYDROCHLORIDE 10 MG: 5 TABLET ORAL at 04:36

## 2019-02-03 RX ADMIN — TRAMADOL HYDROCHLORIDE 50 MG: 50 TABLET, FILM COATED ORAL at 04:36

## 2019-02-03 RX ADMIN — OXYCODONE HYDROCHLORIDE 10 MG: 10 TABLET, FILM COATED, EXTENDED RELEASE ORAL at 21:23

## 2019-02-03 RX ADMIN — MULTIPLE VITAMINS W/ MINERALS TAB 1 TABLET: TAB at 09:36

## 2019-02-03 RX ADMIN — OXYCODONE HYDROCHLORIDE AND ACETAMINOPHEN 500 MG: 500 TABLET ORAL at 09:36

## 2019-02-03 RX ADMIN — ROSUVASTATIN CALCIUM 20 MG: 10 TABLET, FILM COATED ORAL at 21:23

## 2019-02-03 RX ADMIN — OXYCODONE HYDROCHLORIDE 10 MG: 5 TABLET ORAL at 14:10

## 2019-02-03 RX ADMIN — DOCUSATE SODIUM 100 MG: 100 CAPSULE, LIQUID FILLED ORAL at 09:35

## 2019-02-03 RX ADMIN — ACETAMINOPHEN 1000 MG: 500 TABLET, FILM COATED ORAL at 04:36

## 2019-02-03 RX ADMIN — OXYCODONE HYDROCHLORIDE 10 MG: 10 TABLET, FILM COATED, EXTENDED RELEASE ORAL at 09:36

## 2019-02-03 RX ADMIN — LINAGLIPTIN 5 MG: 5 TABLET, FILM COATED ORAL at 09:36

## 2019-02-03 RX ADMIN — Medication 400 MG: at 09:35

## 2019-02-03 RX ADMIN — ACETAMINOPHEN 1000 MG: 500 TABLET, FILM COATED ORAL at 18:22

## 2019-02-03 RX ADMIN — VANCOMYCIN HYDROCHLORIDE 1500 MG: 10 INJECTION, POWDER, LYOPHILIZED, FOR SOLUTION INTRAVENOUS at 04:36

## 2019-02-03 RX ADMIN — ACETAMINOPHEN 1000 MG: 500 TABLET, FILM COATED ORAL at 09:36

## 2019-02-03 RX ADMIN — OXYCODONE HYDROCHLORIDE 10 MG: 5 TABLET ORAL at 18:22

## 2019-02-03 RX ADMIN — Medication 10 ML: at 21:23

## 2019-02-03 RX ADMIN — RIVAROXABAN 10 MG: 10 TABLET, FILM COATED ORAL at 18:22

## 2019-02-03 RX ADMIN — OXYCODONE HYDROCHLORIDE 10 MG: 5 TABLET ORAL at 09:35

## 2019-02-03 RX ADMIN — CEFEPIME HYDROCHLORIDE 2 G: 2 INJECTION, POWDER, FOR SOLUTION INTRAVENOUS at 14:10

## 2019-02-03 RX ADMIN — METFORMIN HYDROCHLORIDE 500 MG: 500 TABLET ORAL at 18:25

## 2019-02-03 RX ADMIN — CHOLECALCIFEROL CAP 125 MCG (5000 UNIT) 5000 UNITS: 125 CAP at 09:36

## 2019-02-03 ASSESSMENT — PAIN SCALES - GENERAL
PAINLEVEL_OUTOF10: 0
PAINLEVEL_OUTOF10: 9
PAINLEVEL_OUTOF10: 6
PAINLEVEL_OUTOF10: 0
PAINLEVEL_OUTOF10: 7
PAINLEVEL_OUTOF10: 6
PAINLEVEL_OUTOF10: 0
PAINLEVEL_OUTOF10: 0
PAINLEVEL_OUTOF10: 8
PAINLEVEL_OUTOF10: 0
PAINLEVEL_OUTOF10: 0

## 2019-02-04 VITALS
BODY MASS INDEX: 35.79 KG/M2 | DIASTOLIC BLOOD PRESSURE: 68 MMHG | TEMPERATURE: 97.8 F | OXYGEN SATURATION: 93 % | WEIGHT: 202 LBS | RESPIRATION RATE: 18 BRPM | SYSTOLIC BLOOD PRESSURE: 111 MMHG | HEIGHT: 63 IN | HEART RATE: 103 BPM

## 2019-02-04 LAB
ANION GAP SERPL CALCULATED.3IONS-SCNC: 13 MMOL/L (ref 7–19)
BUN BLDV-MCNC: 9 MG/DL (ref 8–23)
CALCIUM SERPL-MCNC: 9.6 MG/DL (ref 8.8–10.2)
CHLORIDE BLD-SCNC: 100 MMOL/L (ref 98–111)
CO2: 24 MMOL/L (ref 22–29)
CREAT SERPL-MCNC: 0.6 MG/DL (ref 0.5–0.9)
GFR NON-AFRICAN AMERICAN: >60
GLUCOSE BLD-MCNC: 120 MG/DL (ref 74–109)
GLUCOSE BLD-MCNC: 136 MG/DL (ref 70–99)
HCT VFR BLD CALC: 27.5 % (ref 37–47)
HEMOGLOBIN: 8.5 G/DL (ref 12–16)
PERFORMED ON: ABNORMAL
POTASSIUM REFLEX MAGNESIUM: 3.8 MMOL/L (ref 3.5–5)
SODIUM BLD-SCNC: 137 MMOL/L (ref 136–145)

## 2019-02-04 PROCEDURE — 6370000000 HC RX 637 (ALT 250 FOR IP): Performed by: ORTHOPAEDIC SURGERY

## 2019-02-04 PROCEDURE — 6370000000 HC RX 637 (ALT 250 FOR IP): Performed by: NURSE PRACTITIONER

## 2019-02-04 PROCEDURE — 85014 HEMATOCRIT: CPT

## 2019-02-04 PROCEDURE — 6360000002 HC RX W HCPCS: Performed by: ORTHOPAEDIC SURGERY

## 2019-02-04 PROCEDURE — 2580000003 HC RX 258: Performed by: ORTHOPAEDIC SURGERY

## 2019-02-04 PROCEDURE — 85018 HEMOGLOBIN: CPT

## 2019-02-04 PROCEDURE — 36415 COLL VENOUS BLD VENIPUNCTURE: CPT

## 2019-02-04 PROCEDURE — 80048 BASIC METABOLIC PNL TOTAL CA: CPT

## 2019-02-04 PROCEDURE — 82948 REAGENT STRIP/BLOOD GLUCOSE: CPT

## 2019-02-04 RX ORDER — ETHYL CHLORIDE 100 %
AEROSOL, SPRAY (ML) TOPICAL ONCE
Status: DISCONTINUED | OUTPATIENT
Start: 2019-02-04 | End: 2019-02-04 | Stop reason: HOSPADM

## 2019-02-04 RX ORDER — OXYCODONE HYDROCHLORIDE 10 MG/1
10 TABLET ORAL EVERY 4 HOURS PRN
Qty: 60 TABLET | Refills: 0
Start: 2019-02-04 | End: 2019-03-06

## 2019-02-04 RX ORDER — LIDOCAINE HYDROCHLORIDE 10 MG/ML
5 INJECTION, SOLUTION EPIDURAL; INFILTRATION; INTRACAUDAL; PERINEURAL ONCE
Status: DISCONTINUED | OUTPATIENT
Start: 2019-02-04 | End: 2019-02-04 | Stop reason: HOSPADM

## 2019-02-04 RX ADMIN — Medication 550 MG: at 08:15

## 2019-02-04 RX ADMIN — OXYCODONE HYDROCHLORIDE 10 MG: 10 TABLET, FILM COATED, EXTENDED RELEASE ORAL at 08:15

## 2019-02-04 RX ADMIN — OXYCODONE HYDROCHLORIDE AND ACETAMINOPHEN 500 MG: 500 TABLET ORAL at 08:16

## 2019-02-04 RX ADMIN — OXYCODONE HYDROCHLORIDE 10 MG: 5 TABLET ORAL at 01:40

## 2019-02-04 RX ADMIN — CLONIDINE HYDROCHLORIDE 0.1 MG: 0.1 TABLET ORAL at 08:15

## 2019-02-04 RX ADMIN — Medication 400 MG: at 08:15

## 2019-02-04 RX ADMIN — DOCUSATE SODIUM 100 MG: 100 CAPSULE, LIQUID FILLED ORAL at 08:15

## 2019-02-04 RX ADMIN — MULTIPLE VITAMINS W/ MINERALS TAB 1 TABLET: TAB at 08:15

## 2019-02-04 RX ADMIN — OXYCODONE HYDROCHLORIDE 10 MG: 5 TABLET ORAL at 06:24

## 2019-02-04 RX ADMIN — TRAMADOL HYDROCHLORIDE 50 MG: 50 TABLET, FILM COATED ORAL at 06:24

## 2019-02-04 RX ADMIN — CEFEPIME HYDROCHLORIDE 2 G: 2 INJECTION, POWDER, FOR SOLUTION INTRAVENOUS at 00:00

## 2019-02-04 RX ADMIN — METFORMIN HYDROCHLORIDE 500 MG: 500 TABLET ORAL at 08:16

## 2019-02-04 RX ADMIN — OXYCODONE HYDROCHLORIDE 10 MG: 5 TABLET ORAL at 11:06

## 2019-02-04 RX ADMIN — FLUCONAZOLE 150 MG: 100 TABLET ORAL at 08:15

## 2019-02-04 RX ADMIN — TRAMADOL HYDROCHLORIDE 50 MG: 50 TABLET, FILM COATED ORAL at 01:40

## 2019-02-04 RX ADMIN — LINAGLIPTIN 5 MG: 5 TABLET, FILM COATED ORAL at 08:15

## 2019-02-04 RX ADMIN — ACETAMINOPHEN 1000 MG: 500 TABLET, FILM COATED ORAL at 01:40

## 2019-02-04 RX ADMIN — CHOLECALCIFEROL CAP 125 MCG (5000 UNIT) 5000 UNITS: 125 CAP at 08:16

## 2019-02-04 ASSESSMENT — PAIN SCALES - GENERAL
PAINLEVEL_OUTOF10: 8
PAINLEVEL_OUTOF10: 7
PAINLEVEL_OUTOF10: 0
PAINLEVEL_OUTOF10: 5
PAINLEVEL_OUTOF10: 6
PAINLEVEL_OUTOF10: 3

## 2019-02-05 LAB
ANAEROBIC CULTURE: NORMAL
CULTURE SURGICAL: NORMAL
GRAM STAIN RESULT: NORMAL

## 2019-02-18 ENCOUNTER — TELEPHONE (OUTPATIENT)
Dept: INPATIENT UNIT | Age: 64
End: 2019-02-18

## 2020-09-18 ENCOUNTER — HOSPITAL ENCOUNTER (OUTPATIENT)
Dept: PREADMISSION TESTING | Age: 65
Discharge: HOME | End: 2020-09-22
Payer: COMMERCIAL

## 2020-09-18 ENCOUNTER — HOSPITAL ENCOUNTER (OUTPATIENT)
Dept: GENERAL RADIOLOGY | Age: 65
Discharge: HOME | End: 2020-09-18
Payer: COMMERCIAL

## 2020-09-18 VITALS — WEIGHT: 217 LBS | HEIGHT: 63 IN | BODY MASS INDEX: 38.45 KG/M2

## 2020-09-18 LAB
ABO/RH: NORMAL
ABO/RH: NORMAL
ALBUMIN SERPL-MCNC: 4.3 G/DL (ref 3.5–5.2)
ALP BLD-CCNC: 94 U/L (ref 35–104)
ALT SERPL-CCNC: 20 U/L (ref 5–33)
ANION GAP SERPL CALCULATED.3IONS-SCNC: 13 MMOL/L (ref 7–19)
ANTIBODY IDENTIFICATION: NORMAL
ANTIBODY SCREEN: NORMAL
APTT: 28.2 SEC (ref 26–36.2)
AST SERPL-CCNC: 22 U/L (ref 5–32)
BASOPHILS ABSOLUTE: 0 K/UL (ref 0–0.2)
BASOPHILS RELATIVE PERCENT: 0.2 % (ref 0–1)
BILIRUB SERPL-MCNC: <0.2 MG/DL (ref 0.2–1.2)
BILIRUBIN URINE: NEGATIVE
BLOOD, URINE: NEGATIVE
BUN BLDV-MCNC: 13 MG/DL (ref 8–23)
CALCIUM SERPL-MCNC: 10.2 MG/DL (ref 8.8–10.2)
CHLORIDE BLD-SCNC: 105 MMOL/L (ref 98–111)
CLARITY: CLEAR
CO2: 24 MMOL/L (ref 22–29)
COLOR: YELLOW
CREAT SERPL-MCNC: 0.5 MG/DL (ref 0.5–0.9)
EKG P AXIS: 65 DEGREES
EKG P-R INTERVAL: 138 MS
EKG Q-T INTERVAL: 376 MS
EKG QRS DURATION: 86 MS
EKG QTC CALCULATION (BAZETT): 402 MS
EKG T AXIS: 57 DEGREES
EOSINOPHILS ABSOLUTE: 0.2 K/UL (ref 0–0.6)
EOSINOPHILS RELATIVE PERCENT: 2.1 % (ref 0–5)
GFR AFRICAN AMERICAN: >59
GFR NON-AFRICAN AMERICAN: >60
GLUCOSE BLD-MCNC: 96 MG/DL (ref 74–109)
GLUCOSE URINE: NEGATIVE MG/DL
HBA1C MFR BLD: 6.7 % (ref 4–6)
HCT VFR BLD CALC: 39.5 % (ref 37–47)
HEMOGLOBIN: 12.3 G/DL (ref 12–16)
IMMATURE GRANULOCYTES #: 0 K/UL
INR BLD: 1.02 (ref 0.88–1.18)
KETONES, URINE: NEGATIVE MG/DL
LEUKOCYTE ESTERASE, URINE: NEGATIVE
LYMPHOCYTES ABSOLUTE: 2.3 K/UL (ref 1.1–4.5)
LYMPHOCYTES RELATIVE PERCENT: 23.5 % (ref 20–40)
MCH RBC QN AUTO: 28.4 PG (ref 27–31)
MCHC RBC AUTO-ENTMCNC: 31.1 G/DL (ref 33–37)
MCV RBC AUTO: 91.2 FL (ref 81–99)
MONOCYTES ABSOLUTE: 0.7 K/UL (ref 0–0.9)
MONOCYTES RELATIVE PERCENT: 7.2 % (ref 0–10)
NEUTROPHILS ABSOLUTE: 6.5 K/UL (ref 1.5–7.5)
NEUTROPHILS RELATIVE PERCENT: 66.8 % (ref 50–65)
NITRITE, URINE: NEGATIVE
PDW BLD-RTO: 14.5 % (ref 11.5–14.5)
PH UA: 6 (ref 5–8)
PLATELET # BLD: 293 K/UL (ref 130–400)
PMV BLD AUTO: 10.5 FL (ref 9.4–12.3)
POTASSIUM SERPL-SCNC: 4.3 MMOL/L (ref 3.5–5)
PROTEIN UA: NEGATIVE MG/DL
PROTHROMBIN TIME: 13.3 SEC (ref 12–14.6)
RBC # BLD: 4.33 M/UL (ref 4.2–5.4)
SODIUM BLD-SCNC: 142 MMOL/L (ref 136–145)
SPECIFIC GRAVITY UA: 1.03 (ref 1–1.03)
TOTAL PROTEIN: 7.3 G/DL (ref 6.6–8.7)
UROBILINOGEN, URINE: 0.2 E.U./DL
WBC # BLD: 9.7 K/UL (ref 4.8–10.8)

## 2020-09-18 PROCEDURE — 80053 COMPREHEN METABOLIC PANEL: CPT

## 2020-09-18 PROCEDURE — 93010 ELECTROCARDIOGRAM REPORT: CPT | Performed by: INTERNAL MEDICINE

## 2020-09-18 PROCEDURE — 86901 BLOOD TYPING SEROLOGIC RH(D): CPT

## 2020-09-18 PROCEDURE — 87081 CULTURE SCREEN ONLY: CPT

## 2020-09-18 PROCEDURE — 81003 URINALYSIS AUTO W/O SCOPE: CPT

## 2020-09-18 PROCEDURE — 86850 RBC ANTIBODY SCREEN: CPT

## 2020-09-18 PROCEDURE — 85730 THROMBOPLASTIN TIME PARTIAL: CPT

## 2020-09-18 PROCEDURE — 71046 X-RAY EXAM CHEST 2 VIEWS: CPT

## 2020-09-18 PROCEDURE — 83036 HEMOGLOBIN GLYCOSYLATED A1C: CPT

## 2020-09-18 PROCEDURE — 93005 ELECTROCARDIOGRAM TRACING: CPT | Performed by: ORTHOPAEDIC SURGERY

## 2020-09-18 PROCEDURE — 85025 COMPLETE CBC W/AUTO DIFF WBC: CPT

## 2020-09-18 PROCEDURE — 86900 BLOOD TYPING SEROLOGIC ABO: CPT

## 2020-09-18 PROCEDURE — 86870 RBC ANTIBODY IDENTIFICATION: CPT

## 2020-09-18 PROCEDURE — 85610 PROTHROMBIN TIME: CPT

## 2020-09-18 RX ORDER — MELOXICAM 15 MG/1
15 TABLET ORAL DAILY
Status: ON HOLD | COMMUNITY
End: 2020-09-25 | Stop reason: HOSPADM

## 2020-09-18 RX ORDER — ACETAMINOPHEN 500 MG
1000 TABLET ORAL EVERY 6 HOURS PRN
COMMUNITY

## 2020-09-18 RX ORDER — CELECOXIB 200 MG/1
200 CAPSULE ORAL ONCE
Status: CANCELLED | OUTPATIENT
Start: 2020-09-23

## 2020-09-18 RX ORDER — DEXAMETHASONE SODIUM PHOSPHATE 10 MG/ML
10 INJECTION, SOLUTION INTRAMUSCULAR; INTRAVENOUS ONCE
Status: CANCELLED | OUTPATIENT
Start: 2020-09-23

## 2020-09-18 RX ORDER — PREGABALIN 75 MG/1
75 CAPSULE ORAL ONCE
Status: CANCELLED | OUTPATIENT
Start: 2020-09-23

## 2020-09-18 RX ORDER — ACETAMINOPHEN 500 MG
1000 TABLET ORAL ONCE
Status: CANCELLED | OUTPATIENT
Start: 2020-09-23

## 2020-09-18 RX ORDER — AMLODIPINE BESYLATE 5 MG/1
5 TABLET ORAL DAILY
COMMUNITY

## 2020-09-19 LAB — MRSA CULTURE ONLY: NORMAL

## 2020-09-20 LAB — SARS-COV-2, NAA: NOT DETECTED

## 2020-09-23 ENCOUNTER — APPOINTMENT (OUTPATIENT)
Dept: GENERAL RADIOLOGY | Age: 65
End: 2020-09-23
Attending: ORTHOPAEDIC SURGERY
Payer: COMMERCIAL

## 2020-09-23 ENCOUNTER — HOSPITAL ENCOUNTER (OUTPATIENT)
Age: 65
Setting detail: OBSERVATION
Discharge: HOME HEALTH | End: 2020-09-25
Attending: ORTHOPAEDIC SURGERY | Admitting: ORTHOPAEDIC SURGERY
Payer: COMMERCIAL

## 2020-09-23 ENCOUNTER — ANESTHESIA (OUTPATIENT)
Dept: OPERATING ROOM | Age: 65
End: 2020-09-23
Payer: COMMERCIAL

## 2020-09-23 ENCOUNTER — ANESTHESIA EVENT (OUTPATIENT)
Dept: OPERATING ROOM | Age: 65
End: 2020-09-23
Payer: COMMERCIAL

## 2020-09-23 VITALS — OXYGEN SATURATION: 95 % | DIASTOLIC BLOOD PRESSURE: 58 MMHG | SYSTOLIC BLOOD PRESSURE: 106 MMHG

## 2020-09-23 PROBLEM — K21.9 GASTROESOPHAGEAL REFLUX DISEASE WITHOUT ESOPHAGITIS: Status: ACTIVE | Noted: 2020-09-23

## 2020-09-23 PROBLEM — E11.65 TYPE 2 DIABETES MELLITUS WITH HYPERGLYCEMIA, WITHOUT LONG-TERM CURRENT USE OF INSULIN (HCC): Status: ACTIVE | Noted: 2020-09-23

## 2020-09-23 PROBLEM — I10 ESSENTIAL HYPERTENSION: Status: ACTIVE | Noted: 2020-09-23

## 2020-09-23 PROBLEM — M17.12 PRIMARY OSTEOARTHRITIS OF LEFT KNEE: Status: ACTIVE | Noted: 2020-09-23

## 2020-09-23 PROBLEM — K59.03 DRUG-INDUCED CONSTIPATION: Status: ACTIVE | Noted: 2020-09-23

## 2020-09-23 LAB
ABO/RH: NORMAL
ANTIBODY SCREEN: NORMAL
GLUCOSE BLD-MCNC: 114 MG/DL (ref 70–99)
GLUCOSE BLD-MCNC: 181 MG/DL (ref 70–99)
HBA1C MFR BLD: 6.7 % (ref 4–6)
PERFORMED ON: ABNORMAL
PERFORMED ON: ABNORMAL

## 2020-09-23 PROCEDURE — 6360000002 HC RX W HCPCS

## 2020-09-23 PROCEDURE — 6360000002 HC RX W HCPCS: Performed by: NURSE ANESTHETIST, CERTIFIED REGISTERED

## 2020-09-23 PROCEDURE — 64447 NJX AA&/STRD FEMORAL NRV IMG: CPT | Performed by: NURSE ANESTHETIST, CERTIFIED REGISTERED

## 2020-09-23 PROCEDURE — 2720000010 HC SURG SUPPLY STERILE: Performed by: ORTHOPAEDIC SURGERY

## 2020-09-23 PROCEDURE — 2500000003 HC RX 250 WO HCPCS: Performed by: ORTHOPAEDIC SURGERY

## 2020-09-23 PROCEDURE — 6360000002 HC RX W HCPCS: Performed by: ORTHOPAEDIC SURGERY

## 2020-09-23 PROCEDURE — 2709999900 HC NON-CHARGEABLE SUPPLY: Performed by: ORTHOPAEDIC SURGERY

## 2020-09-23 PROCEDURE — 2580000003 HC RX 258: Performed by: ORTHOPAEDIC SURGERY

## 2020-09-23 PROCEDURE — 1210000000 HC MED SURG R&B

## 2020-09-23 PROCEDURE — 86900 BLOOD TYPING SEROLOGIC ABO: CPT

## 2020-09-23 PROCEDURE — 3700000000 HC ANESTHESIA ATTENDED CARE: Performed by: ORTHOPAEDIC SURGERY

## 2020-09-23 PROCEDURE — G0378 HOSPITAL OBSERVATION PER HR: HCPCS

## 2020-09-23 PROCEDURE — 73560 X-RAY EXAM OF KNEE 1 OR 2: CPT

## 2020-09-23 PROCEDURE — 82947 ASSAY GLUCOSE BLOOD QUANT: CPT

## 2020-09-23 PROCEDURE — 6370000000 HC RX 637 (ALT 250 FOR IP): Performed by: ORTHOPAEDIC SURGERY

## 2020-09-23 PROCEDURE — 86901 BLOOD TYPING SEROLOGIC RH(D): CPT

## 2020-09-23 PROCEDURE — 2500000003 HC RX 250 WO HCPCS: Performed by: NURSE ANESTHETIST, CERTIFIED REGISTERED

## 2020-09-23 PROCEDURE — 3600000015 HC SURGERY LEVEL 5 ADDTL 15MIN: Performed by: ORTHOPAEDIC SURGERY

## 2020-09-23 PROCEDURE — 83036 HEMOGLOBIN GLYCOSYLATED A1C: CPT

## 2020-09-23 PROCEDURE — 36415 COLL VENOUS BLD VENIPUNCTURE: CPT

## 2020-09-23 PROCEDURE — C1776 JOINT DEVICE (IMPLANTABLE): HCPCS | Performed by: ORTHOPAEDIC SURGERY

## 2020-09-23 PROCEDURE — C9290 INJ, BUPIVACAINE LIPOSOME: HCPCS | Performed by: ORTHOPAEDIC SURGERY

## 2020-09-23 PROCEDURE — 86922 COMPATIBILITY TEST ANTIGLOB: CPT

## 2020-09-23 PROCEDURE — 7100000001 HC PACU RECOVERY - ADDTL 15 MIN: Performed by: ORTHOPAEDIC SURGERY

## 2020-09-23 PROCEDURE — 2700000000 HC OXYGEN THERAPY PER DAY

## 2020-09-23 PROCEDURE — 7100000000 HC PACU RECOVERY - FIRST 15 MIN: Performed by: ORTHOPAEDIC SURGERY

## 2020-09-23 PROCEDURE — C1713 ANCHOR/SCREW BN/BN,TIS/BN: HCPCS | Performed by: ORTHOPAEDIC SURGERY

## 2020-09-23 PROCEDURE — 6370000000 HC RX 637 (ALT 250 FOR IP): Performed by: ANESTHESIOLOGY

## 2020-09-23 PROCEDURE — 3700000001 HC ADD 15 MINUTES (ANESTHESIA): Performed by: ORTHOPAEDIC SURGERY

## 2020-09-23 PROCEDURE — 3600000005 HC SURGERY LEVEL 5 BASE: Performed by: ORTHOPAEDIC SURGERY

## 2020-09-23 PROCEDURE — 6360000002 HC RX W HCPCS: Performed by: ANESTHESIOLOGY

## 2020-09-23 PROCEDURE — 86850 RBC ANTIBODY SCREEN: CPT

## 2020-09-23 DEVICE — PLUG BNE CEM M POLYETH FOR 11-13MM IM CNL: Type: IMPLANTABLE DEVICE | Site: KNEE | Status: FUNCTIONAL

## 2020-09-23 DEVICE — IMPL KNEE PSN ALL POLY PAT 38MM: Type: IMPLANTABLE DEVICE | Site: KNEE | Status: FUNCTIONAL

## 2020-09-23 DEVICE — COMPONENT FEM SZ 6 L KNEE CO CHROM NAR POST STBL CEM: Type: IMPLANTABLE DEVICE | Site: KNEE | Status: FUNCTIONAL

## 2020-09-23 DEVICE — EXTENSION STEM L30MM DIA14MM KNEE TAPR CEM PERSONA: Type: IMPLANTABLE DEVICE | Site: KNEE | Status: FUNCTIONAL

## 2020-09-23 DEVICE — COMPONENT ARTC SURF PS 6-9 CD 10 MM LT TIB FIX BEAR: Type: IMPLANTABLE DEVICE | Site: KNEE | Status: FUNCTIONAL

## 2020-09-23 DEVICE — Z  INACTIVE USE 2750024 CEMENT BONE 40GM W/ GENTMYCN HI VISC PALACOS R+G: Type: IMPLANTABLE DEVICE | Site: KNEE | Status: FUNCTIONAL

## 2020-09-23 DEVICE — PSN TIB STM 5 DEG SZ D L: Type: IMPLANTABLE DEVICE | Site: KNEE | Status: FUNCTIONAL

## 2020-09-23 RX ORDER — OXYCODONE HYDROCHLORIDE 5 MG/1
20 TABLET ORAL EVERY 4 HOURS PRN
Status: DISCONTINUED | OUTPATIENT
Start: 2020-09-23 | End: 2020-09-25 | Stop reason: HOSPADM

## 2020-09-23 RX ORDER — SODIUM CHLORIDE 0.9 % (FLUSH) 0.9 %
10 SYRINGE (ML) INJECTION PRN
Status: DISCONTINUED | OUTPATIENT
Start: 2020-09-23 | End: 2020-09-23 | Stop reason: HOSPADM

## 2020-09-23 RX ORDER — FAMOTIDINE 20 MG/1
20 TABLET, FILM COATED ORAL ONCE
Status: COMPLETED | OUTPATIENT
Start: 2020-09-23 | End: 2020-09-23

## 2020-09-23 RX ORDER — CLINDAMYCIN PHOSPHATE 900 MG/50ML
900 INJECTION INTRAVENOUS EVERY 8 HOURS
Status: COMPLETED | OUTPATIENT
Start: 2020-09-23 | End: 2020-09-25

## 2020-09-23 RX ORDER — HYDROMORPHONE HYDROCHLORIDE 1 MG/ML
1 INJECTION, SOLUTION INTRAMUSCULAR; INTRAVENOUS; SUBCUTANEOUS
Status: DISCONTINUED | OUTPATIENT
Start: 2020-09-23 | End: 2020-09-23

## 2020-09-23 RX ORDER — PROPOFOL 10 MG/ML
INJECTION, EMULSION INTRAVENOUS CONTINUOUS PRN
Status: DISCONTINUED | OUTPATIENT
Start: 2020-09-23 | End: 2020-09-23 | Stop reason: SDUPTHER

## 2020-09-23 RX ORDER — HYDROMORPHONE HYDROCHLORIDE 1 MG/ML
1 INJECTION, SOLUTION INTRAMUSCULAR; INTRAVENOUS; SUBCUTANEOUS
Status: DISCONTINUED | OUTPATIENT
Start: 2020-09-23 | End: 2020-09-25 | Stop reason: HOSPADM

## 2020-09-23 RX ORDER — TRAMADOL HYDROCHLORIDE 50 MG/1
50 TABLET ORAL EVERY 6 HOURS
Status: DISCONTINUED | OUTPATIENT
Start: 2020-09-23 | End: 2020-09-25 | Stop reason: HOSPADM

## 2020-09-23 RX ORDER — MIDAZOLAM HYDROCHLORIDE 1 MG/ML
INJECTION INTRAMUSCULAR; INTRAVENOUS PRN
Status: DISCONTINUED | OUTPATIENT
Start: 2020-09-23 | End: 2020-09-23 | Stop reason: SDUPTHER

## 2020-09-23 RX ORDER — EPHEDRINE SULFATE 50 MG/ML
INJECTION, SOLUTION INTRAVENOUS PRN
Status: DISCONTINUED | OUTPATIENT
Start: 2020-09-23 | End: 2020-09-23 | Stop reason: SDUPTHER

## 2020-09-23 RX ORDER — DEXTROSE MONOHYDRATE 25 G/50ML
12.5 INJECTION, SOLUTION INTRAVENOUS PRN
Status: DISCONTINUED | OUTPATIENT
Start: 2020-09-23 | End: 2020-09-25 | Stop reason: HOSPADM

## 2020-09-23 RX ORDER — ACETAMINOPHEN 500 MG
1000 TABLET ORAL ONCE
Status: COMPLETED | OUTPATIENT
Start: 2020-09-23 | End: 2020-09-23

## 2020-09-23 RX ORDER — ASCORBIC ACID 500 MG
500 TABLET ORAL DAILY
Status: DISCONTINUED | OUTPATIENT
Start: 2020-09-23 | End: 2020-09-25 | Stop reason: HOSPADM

## 2020-09-23 RX ORDER — POLYETHYLENE GLYCOL 3350 17 G/17G
17 POWDER, FOR SOLUTION ORAL 2 TIMES DAILY
Status: DISCONTINUED | OUTPATIENT
Start: 2020-09-23 | End: 2020-09-25 | Stop reason: HOSPADM

## 2020-09-23 RX ORDER — ROSUVASTATIN CALCIUM 20 MG/1
20 TABLET, COATED ORAL NIGHTLY
Status: DISCONTINUED | OUTPATIENT
Start: 2020-09-23 | End: 2020-09-25 | Stop reason: HOSPADM

## 2020-09-23 RX ORDER — LANOLIN ALCOHOL/MO/W.PET/CERES
400 CREAM (GRAM) TOPICAL DAILY
Status: DISCONTINUED | OUTPATIENT
Start: 2020-09-23 | End: 2020-09-25 | Stop reason: HOSPADM

## 2020-09-23 RX ORDER — SODIUM CHLORIDE 0.9 % (FLUSH) 0.9 %
10 SYRINGE (ML) INJECTION EVERY 12 HOURS SCHEDULED
Status: DISCONTINUED | OUTPATIENT
Start: 2020-09-23 | End: 2020-09-25 | Stop reason: HOSPADM

## 2020-09-23 RX ORDER — DEXTROSE MONOHYDRATE 50 MG/ML
100 INJECTION, SOLUTION INTRAVENOUS PRN
Status: DISCONTINUED | OUTPATIENT
Start: 2020-09-23 | End: 2020-09-25 | Stop reason: HOSPADM

## 2020-09-23 RX ORDER — DEXAMETHASONE SODIUM PHOSPHATE 10 MG/ML
10 INJECTION, SOLUTION INTRAMUSCULAR; INTRAVENOUS ONCE
Status: DISCONTINUED | OUTPATIENT
Start: 2020-09-23 | End: 2020-09-23 | Stop reason: HOSPADM

## 2020-09-23 RX ORDER — MIDAZOLAM HYDROCHLORIDE 1 MG/ML
INJECTION INTRAMUSCULAR; INTRAVENOUS
Status: COMPLETED
Start: 2020-09-23 | End: 2020-09-23

## 2020-09-23 RX ORDER — SODIUM CHLORIDE 0.9 % (FLUSH) 0.9 %
10 SYRINGE (ML) INJECTION PRN
Status: DISCONTINUED | OUTPATIENT
Start: 2020-09-23 | End: 2020-09-25 | Stop reason: HOSPADM

## 2020-09-23 RX ORDER — OXYCODONE HYDROCHLORIDE 5 MG/1
5 TABLET ORAL EVERY 4 HOURS PRN
Status: DISCONTINUED | OUTPATIENT
Start: 2020-09-23 | End: 2020-09-25 | Stop reason: HOSPADM

## 2020-09-23 RX ORDER — M-VIT,TX,IRON,MINS/CALC/FOLIC 27MG-0.4MG
1 TABLET ORAL DAILY
Status: DISCONTINUED | OUTPATIENT
Start: 2020-09-23 | End: 2020-09-25 | Stop reason: HOSPADM

## 2020-09-23 RX ORDER — HYDROMORPHONE HYDROCHLORIDE 1 MG/ML
0.5 INJECTION, SOLUTION INTRAMUSCULAR; INTRAVENOUS; SUBCUTANEOUS
Status: DISCONTINUED | OUTPATIENT
Start: 2020-09-23 | End: 2020-09-25 | Stop reason: HOSPADM

## 2020-09-23 RX ORDER — SODIUM CHLORIDE 0.9 % (FLUSH) 0.9 %
10 SYRINGE (ML) INJECTION EVERY 12 HOURS SCHEDULED
Status: DISCONTINUED | OUTPATIENT
Start: 2020-09-23 | End: 2020-09-23 | Stop reason: HOSPADM

## 2020-09-23 RX ORDER — SODIUM CHLORIDE, SODIUM LACTATE, POTASSIUM CHLORIDE, CALCIUM CHLORIDE 600; 310; 30; 20 MG/100ML; MG/100ML; MG/100ML; MG/100ML
INJECTION, SOLUTION INTRAVENOUS CONTINUOUS
Status: DISCONTINUED | OUTPATIENT
Start: 2020-09-23 | End: 2020-09-23 | Stop reason: ALTCHOICE

## 2020-09-23 RX ORDER — CELECOXIB 200 MG/1
200 CAPSULE ORAL ONCE
Status: COMPLETED | OUTPATIENT
Start: 2020-09-23 | End: 2020-09-23

## 2020-09-23 RX ORDER — ONDANSETRON 4 MG/1
4 TABLET, ORALLY DISINTEGRATING ORAL EVERY 8 HOURS PRN
Status: DISCONTINUED | OUTPATIENT
Start: 2020-09-23 | End: 2020-09-25 | Stop reason: HOSPADM

## 2020-09-23 RX ORDER — NICOTINE POLACRILEX 4 MG
15 LOZENGE BUCCAL PRN
Status: DISCONTINUED | OUTPATIENT
Start: 2020-09-23 | End: 2020-09-25 | Stop reason: HOSPADM

## 2020-09-23 RX ORDER — BUPIVACAINE HYDROCHLORIDE 7.5 MG/ML
INJECTION, SOLUTION INTRASPINAL PRN
Status: DISCONTINUED | OUTPATIENT
Start: 2020-09-23 | End: 2020-09-23 | Stop reason: SDUPTHER

## 2020-09-23 RX ORDER — ROPIVACAINE HYDROCHLORIDE 5 MG/ML
INJECTION, SOLUTION EPIDURAL; INFILTRATION; PERINEURAL
Status: COMPLETED | OUTPATIENT
Start: 2020-09-23 | End: 2020-09-23

## 2020-09-23 RX ORDER — TRAMADOL HYDROCHLORIDE 50 MG/1
100 TABLET ORAL EVERY 6 HOURS
Status: DISCONTINUED | OUTPATIENT
Start: 2020-09-23 | End: 2020-09-23 | Stop reason: DRUGHIGH

## 2020-09-23 RX ORDER — HYDROMORPHONE HYDROCHLORIDE 1 MG/ML
2 INJECTION, SOLUTION INTRAMUSCULAR; INTRAVENOUS; SUBCUTANEOUS
Status: DISCONTINUED | OUTPATIENT
Start: 2020-09-23 | End: 2020-09-23

## 2020-09-23 RX ORDER — OXYCODONE HYDROCHLORIDE 5 MG/1
10 TABLET ORAL EVERY 4 HOURS PRN
Status: DISCONTINUED | OUTPATIENT
Start: 2020-09-23 | End: 2020-09-23

## 2020-09-23 RX ORDER — ONDANSETRON 2 MG/ML
4 INJECTION INTRAMUSCULAR; INTRAVENOUS EVERY 6 HOURS PRN
Status: DISCONTINUED | OUTPATIENT
Start: 2020-09-23 | End: 2020-09-25 | Stop reason: HOSPADM

## 2020-09-23 RX ORDER — OXYCODONE HCL 10 MG/1
10 TABLET, FILM COATED, EXTENDED RELEASE ORAL EVERY 12 HOURS SCHEDULED
Status: DISCONTINUED | OUTPATIENT
Start: 2020-09-23 | End: 2020-09-25 | Stop reason: HOSPADM

## 2020-09-23 RX ORDER — OXYCODONE HYDROCHLORIDE 5 MG/1
10 TABLET ORAL EVERY 4 HOURS PRN
Status: DISCONTINUED | OUTPATIENT
Start: 2020-09-23 | End: 2020-09-25 | Stop reason: HOSPADM

## 2020-09-23 RX ORDER — PREGABALIN 75 MG/1
75 CAPSULE ORAL ONCE
Status: COMPLETED | OUTPATIENT
Start: 2020-09-23 | End: 2020-09-23

## 2020-09-23 RX ORDER — SODIUM CHLORIDE, SODIUM LACTATE, POTASSIUM CHLORIDE, CALCIUM CHLORIDE 600; 310; 30; 20 MG/100ML; MG/100ML; MG/100ML; MG/100ML
INJECTION, SOLUTION INTRAVENOUS CONTINUOUS
Status: DISCONTINUED | OUTPATIENT
Start: 2020-09-23 | End: 2020-09-25 | Stop reason: HOSPADM

## 2020-09-23 RX ORDER — SODIUM CHLORIDE 9 MG/ML
INJECTION, SOLUTION INTRAVENOUS CONTINUOUS
Status: DISCONTINUED | OUTPATIENT
Start: 2020-09-23 | End: 2020-09-25 | Stop reason: HOSPADM

## 2020-09-23 RX ORDER — 0.9 % SODIUM CHLORIDE 0.9 %
500 INTRAVENOUS SOLUTION INTRAVENOUS PRN
Status: DISCONTINUED | OUTPATIENT
Start: 2020-09-23 | End: 2020-09-25 | Stop reason: HOSPADM

## 2020-09-23 RX ORDER — FENTANYL CITRATE 50 UG/ML
50 INJECTION, SOLUTION INTRAMUSCULAR; INTRAVENOUS EVERY 5 MIN PRN
Status: DISCONTINUED | OUTPATIENT
Start: 2020-09-23 | End: 2020-09-23 | Stop reason: HOSPADM

## 2020-09-23 RX ORDER — SENNA AND DOCUSATE SODIUM 50; 8.6 MG/1; MG/1
1 TABLET, FILM COATED ORAL 2 TIMES DAILY
Status: DISCONTINUED | OUTPATIENT
Start: 2020-09-23 | End: 2020-09-25 | Stop reason: HOSPADM

## 2020-09-23 RX ORDER — DEXAMETHASONE SODIUM PHOSPHATE 10 MG/ML
INJECTION, SOLUTION INTRAMUSCULAR; INTRAVENOUS PRN
Status: DISCONTINUED | OUTPATIENT
Start: 2020-09-23 | End: 2020-09-23 | Stop reason: SDUPTHER

## 2020-09-23 RX ORDER — ACETAMINOPHEN 325 MG/1
650 TABLET ORAL EVERY 6 HOURS
Status: DISCONTINUED | OUTPATIENT
Start: 2020-09-23 | End: 2020-09-25 | Stop reason: HOSPADM

## 2020-09-23 RX ORDER — FENTANYL CITRATE 50 UG/ML
INJECTION, SOLUTION INTRAMUSCULAR; INTRAVENOUS PRN
Status: DISCONTINUED | OUTPATIENT
Start: 2020-09-23 | End: 2020-09-23 | Stop reason: SDUPTHER

## 2020-09-23 RX ORDER — HYDROMORPHONE HYDROCHLORIDE 1 MG/ML
0.5 INJECTION, SOLUTION INTRAMUSCULAR; INTRAVENOUS; SUBCUTANEOUS EVERY 5 MIN PRN
Status: DISCONTINUED | OUTPATIENT
Start: 2020-09-23 | End: 2020-09-23 | Stop reason: HOSPADM

## 2020-09-23 RX ORDER — DIAZEPAM 5 MG/1
5 TABLET ORAL EVERY 6 HOURS PRN
Status: DISCONTINUED | OUTPATIENT
Start: 2020-09-23 | End: 2020-09-25 | Stop reason: HOSPADM

## 2020-09-23 RX ORDER — OXYCODONE HYDROCHLORIDE 5 MG/1
5 TABLET ORAL EVERY 4 HOURS PRN
Status: DISCONTINUED | OUTPATIENT
Start: 2020-09-23 | End: 2020-09-23

## 2020-09-23 RX ORDER — DIPHENHYDRAMINE HCL 25 MG
25 TABLET ORAL EVERY 6 HOURS PRN
Status: DISCONTINUED | OUTPATIENT
Start: 2020-09-23 | End: 2020-09-25 | Stop reason: HOSPADM

## 2020-09-23 RX ORDER — TRANEXAMIC ACID 100 MG/ML
INJECTION, SOLUTION INTRAVENOUS PRN
Status: DISCONTINUED | OUTPATIENT
Start: 2020-09-23 | End: 2020-09-23 | Stop reason: SDUPTHER

## 2020-09-23 RX ADMIN — Medication 2 G: at 14:28

## 2020-09-23 RX ADMIN — SODIUM CHLORIDE, SODIUM LACTATE, POTASSIUM CHLORIDE, AND CALCIUM CHLORIDE: 600; 310; 30; 20 INJECTION, SOLUTION INTRAVENOUS at 13:03

## 2020-09-23 RX ADMIN — PREGABALIN 75 MG: 75 CAPSULE ORAL at 13:03

## 2020-09-23 RX ADMIN — ACETAMINOPHEN 1000 MG: 500 TABLET ORAL at 13:02

## 2020-09-23 RX ADMIN — PHENYLEPHRINE HYDROCHLORIDE 80 MCG: 10 INJECTION INTRAVENOUS at 14:57

## 2020-09-23 RX ADMIN — OXYCODONE HYDROCHLORIDE 5 MG: 5 TABLET ORAL at 18:20

## 2020-09-23 RX ADMIN — FAMOTIDINE 20 MG: 20 TABLET, FILM COATED ORAL at 13:02

## 2020-09-23 RX ADMIN — PROPOFOL 100 MCG/KG/MIN: 10 INJECTION, EMULSION INTRAVENOUS at 14:25

## 2020-09-23 RX ADMIN — ROPIVACAINE HYDROCHLORIDE 20 ML: 5 INJECTION, SOLUTION EPIDURAL; INFILTRATION; PERINEURAL at 13:58

## 2020-09-23 RX ADMIN — ROSUVASTATIN CALCIUM 20 MG: 20 TABLET, FILM COATED ORAL at 21:26

## 2020-09-23 RX ADMIN — BUPIVACAINE HYDROCHLORIDE IN DEXTROSE 2 ML: 7.5 INJECTION, SOLUTION SUBARACHNOID at 14:23

## 2020-09-23 RX ADMIN — EPHEDRINE SULFATE 20 MG: 50 INJECTION INTRAMUSCULAR; INTRAVENOUS; SUBCUTANEOUS at 14:41

## 2020-09-23 RX ADMIN — MIDAZOLAM 2 MG: 1 INJECTION INTRAMUSCULAR; INTRAVENOUS at 13:52

## 2020-09-23 RX ADMIN — SODIUM CHLORIDE, SODIUM LACTATE, POTASSIUM CHLORIDE, AND CALCIUM CHLORIDE: 600; 310; 30; 20 INJECTION, SOLUTION INTRAVENOUS at 14:43

## 2020-09-23 RX ADMIN — FENTANYL CITRATE 50 MCG: 50 INJECTION INTRAMUSCULAR; INTRAVENOUS at 15:41

## 2020-09-23 RX ADMIN — Medication 2 G: at 21:29

## 2020-09-23 RX ADMIN — OXYCODONE HYDROCHLORIDE 10 MG: 10 TABLET, FILM COATED, EXTENDED RELEASE ORAL at 21:26

## 2020-09-23 RX ADMIN — DOCUSATE SODIUM 50 MG AND SENNOSIDES 8.6 MG 1 TABLET: 8.6; 5 TABLET, FILM COATED ORAL at 21:26

## 2020-09-23 RX ADMIN — PHENYLEPHRINE HYDROCHLORIDE 100 MCG: 10 INJECTION INTRAVENOUS at 15:07

## 2020-09-23 RX ADMIN — ACETAMINOPHEN 650 MG: 325 TABLET ORAL at 18:20

## 2020-09-23 RX ADMIN — CELECOXIB 200 MG: 200 CAPSULE ORAL at 13:02

## 2020-09-23 RX ADMIN — SODIUM CHLORIDE: 9 INJECTION, SOLUTION INTRAVENOUS at 18:22

## 2020-09-23 RX ADMIN — CLINDAMYCIN IN 5 PERCENT DEXTROSE 900 MG: 18 INJECTION, SOLUTION INTRAVENOUS at 18:20

## 2020-09-23 RX ADMIN — FENTANYL CITRATE 25 MCG: 50 INJECTION INTRAMUSCULAR; INTRAVENOUS at 15:33

## 2020-09-23 RX ADMIN — DEXAMETHASONE SODIUM PHOSPHATE 10 MG: 10 INJECTION, SOLUTION INTRAMUSCULAR; INTRAVENOUS at 14:32

## 2020-09-23 RX ADMIN — MIDAZOLAM 2 MG: 1 INJECTION INTRAMUSCULAR; INTRAVENOUS at 14:14

## 2020-09-23 RX ADMIN — TRANEXAMIC ACID 1000 MG: 100 INJECTION, SOLUTION INTRAVENOUS at 16:12

## 2020-09-23 RX ADMIN — EPHEDRINE SULFATE 20 MG: 50 INJECTION INTRAMUSCULAR; INTRAVENOUS; SUBCUTANEOUS at 14:35

## 2020-09-23 RX ADMIN — FENTANYL CITRATE 25 MCG: 50 INJECTION INTRAMUSCULAR; INTRAVENOUS at 15:25

## 2020-09-23 RX ADMIN — EPHEDRINE SULFATE 10 MG: 50 INJECTION INTRAMUSCULAR; INTRAVENOUS; SUBCUTANEOUS at 14:38

## 2020-09-23 RX ADMIN — TRANEXAMIC ACID 1000 MG: 100 INJECTION, SOLUTION INTRAVENOUS at 14:30

## 2020-09-23 ASSESSMENT — PAIN SCALES - GENERAL
PAINLEVEL_OUTOF10: 2
PAINLEVEL_OUTOF10: 4

## 2020-09-23 ASSESSMENT — LIFESTYLE VARIABLES: SMOKING_STATUS: 0

## 2020-09-23 ASSESSMENT — PAIN - FUNCTIONAL ASSESSMENT: PAIN_FUNCTIONAL_ASSESSMENT: 0-10

## 2020-09-23 NOTE — ANESTHESIA PROCEDURE NOTES
Spinal Block    Patient location during procedure: OR  Start time: 9/23/2020 2:17 PM  End time: 9/23/2020 2:23 PM  Reason for block: primary anesthetic  Staffing  Resident/CRNA: PASTOR Vargas CRNA  Performed: resident/CRNA   Preanesthetic Checklist  Completed: patient identified, site marked, surgical consent, pre-op evaluation, timeout performed, IV checked, risks and benefits discussed, monitors and equipment checked, anesthesia consent given, oxygen available and patient being monitored  Spinal Block  Patient position: sitting  Prep: Betasept  Patient monitoring: cardiac monitor, continuous pulse ox, continuous capnometry and frequent blood pressure checks  Approach: midline  Location: L3/L4  Provider prep: mask and sterile gloves  Local infiltration: lidocaine  Agent: bupivacaine  Dose: 2  Dose: 2  Needle  Needle type: Pencan   Needle gauge: 24 G  Needle length: 4 in  Needle insertion depth: 7.5 cm  Kit: [id number]  Lot number: [id number]  Expiration date: 9/30/2021  Assessment  Sensory level: T4  Events: cerebrospinal fluid  Swirl obtained: Yes  CSF: clear  Attempts: 1  Hemodynamics: stable

## 2020-09-23 NOTE — ANESTHESIA POSTPROCEDURE EVALUATION
Department of Anesthesiology  Postprocedure Note    Patient: Rl Yu  MRN: [id number]  YOB: 1955  Date of evaluation: 9/23/2020  Time:  4:51 PM     Procedure Summary     Date:  09/23/20 Room / Location:  [address]    Anesthesia Start:  0785 Anesthesia Stop:  5447    Procedure:  LEFT TOTAL KNEE REPLACEMENT COMPLEX (Left Knee) Diagnosis:  (M17.12)    Surgeon:  Naomi Berman MD Responsible Provider:  PASTOR Loaiza CRNA    Anesthesia Type:  spinal, regional, MAC ASA Status:  3          Anesthesia Type: spinal, regional, MAC    Octaviano Phase I: Octaviano Score: 10    Octaviano Phase II:      Last vitals: Reviewed and per EMR flowsheets. Anesthesia Post Evaluation    Patient location during evaluation: PACU  Patient participation: complete - patient participated  Level of consciousness: awake and alert  Pain score: 0  Airway patency: patent  Nausea & Vomiting: no nausea and no vomiting  Complications: no  Cardiovascular status: hemodynamically stable and blood pressure returned to baseline  Respiratory status: acceptable and nasal cannula  Hydration status: stable  Comments: Vital Signs Stable. Exchanging well. PACU RN received care.

## 2020-09-23 NOTE — ANESTHESIA PROCEDURE NOTES
Peripheral Block    Patient location during procedure: holding area  Start time: 9/23/2020 1:57 PM  End time: 9/23/2020 1:59 PM  Staffing  Anesthesiologist: Negin Velasquez MD  Performed: anesthesiologist   Preanesthetic Checklist  Completed: patient identified, site marked, surgical consent, pre-op evaluation, timeout performed, IV checked, risks and benefits discussed, monitors and equipment checked, anesthesia consent given, oxygen available and patient being monitored  Peripheral Block  Patient position: supine  Prep: ChloraPrep  Patient monitoring: continuous pulse ox and frequent blood pressure checks  Block type: Femoral  Laterality: left  Injection technique: single-shot  Procedures: ultrasound guided  Local infiltration: lidocaine  Adductor canal  Provider prep: mask and sterile gloves  Local infiltration: lidocaine  Needle  Needle type: Quincke   Needle gauge: 20 G  Needle length: 10 cm  Needle localization: ultrasound guidance  Assessment  Injection assessment: negative aspiration for heme, no paresthesia on injection and local visualized surrounding nerve on ultrasound  Paresthesia pain: none  Slow fractionated injection: yes  Hemodynamics: stable  Medications Administered  Ropivacaine (NAROPIN) injection 0.5%, 20 mL  Reason for block: post-op pain management

## 2020-09-23 NOTE — BRIEF OP NOTE
Department of Orthopedic Surgery  Brief Operative Report      Surgeon: Mitra Mccarthy    Procedure: Left TKA Complex    Anesthesia:  Spinal Anesthesia and block    Estimated blood loss:  Less than 100 ml    Fluids:1600cc    TT: 69 minutes    UO: 350cc     Drians:  none      See dictated operative report for full details.

## 2020-09-23 NOTE — H&P
I have reviewed the history and physical for planned procedure. I have re-examined the patient and there are no changes to the history and physical unless noted below.     Electronically signed by Loree Chavez MD on 9/23/2020 at 7:18 AM

## 2020-09-23 NOTE — ANESTHESIA PRE PROCEDURE
Department of Anesthesiology  Preprocedure Note       Name:  Homero Hernandez   Age:  60 y.o.  :  1955                                          MRN:  [id number]         Date:  2020      Surgeon: Bridget Lopez):  Rance Koyanagi, MD    Procedure: LEFT TOTAL KNEE REPLACEMENT COMPLEX (Left )    Medications prior to admission:   Prior to Admission medications    Medication Sig Start Date End Date Taking? Authorizing Provider   amLODIPine (NORVASC) 5 MG tablet Take 5 mg by mouth daily    Historical Provider, MD   meloxicam (MOBIC) 15 MG tablet Take 15 mg by mouth daily    Historical Provider, MD   acetaminophen (TYLENOL) 500 MG tablet Take 1,000 mg by mouth every 6 hours as needed for Pain    Historical Provider, MD   metFORMIN (GLUCOPHAGE) 500 MG tablet Take 500 mg by mouth daily (with breakfast)     Historical Provider, MD   cloNIDine (CATAPRES) 0.1 MG tablet Take 0.1 mg by mouth daily    Historical Provider, MD   rosuvastatin (CRESTOR) 20 MG tablet Take 20 mg by mouth nightly    Historical Provider, MD   Cholecalciferol (VITAMIN D3) 5000 units TABS Take 1 tablet by mouth daily     Historical Provider, MD   Magnesium 400 MG CAPS Take 400 mg by mouth daily     Historical Provider, MD   Multiple Vitamins-Minerals (CENTRUM SILVER 50+WOMEN) TABS Take 1 tablet by mouth daily     Historical Provider, MD   Omega-3 Fatty Acids (FISH OIL) 1000 MG CAPS Take 1,000 mg by mouth daily    Historical Provider, MD   vitamin C (ASCORBIC ACID) 500 MG tablet Take 500 mg by mouth daily    Historical Provider, MD   Potassium 95 MG TABS Take 95 mg by mouth daily     Historical Provider, MD       Current medications:    No current facility-administered medications for this visit. No current outpatient medications on file.      Facility-Administered Medications Ordered in Other Visits   Medication Dose Route Frequency Provider Last Rate Last Dose    lactated ringers infusion   Intravenous Continuous Rance Koyanagi, MD [place] 12.3 09/18/2020    HCT 39.5 09/18/2020    MCV 91.2 09/18/2020    RDW 14.5 09/18/2020     09/18/2020       CMP:   Lab Results   Component Value Date     09/18/2020    K 4.3 09/18/2020    K 3.8 02/04/2019     09/18/2020    CO2 24 09/18/2020    BUN 13 09/18/2020    CREATININE 0.5 09/18/2020    GFRAA >59 09/18/2020    LABGLOM >60 09/18/2020    GLUCOSE 96 09/18/2020    PROT 7.3 09/18/2020    CALCIUM 10.2 09/18/2020    BILITOT <0.2 09/18/2020    ALKPHOS 94 09/18/2020    AST 22 09/18/2020    ALT 20 09/18/2020       POC Tests: No results for input(s): POCGLU, POCNA, POCK, POCCL, POCBUN, POCHEMO, POCHCT in the last 72 hours. Coags:   Lab Results   Component Value Date    PROTIME 13.3 09/18/2020    INR 1.02 09/18/2020    APTT 28.2 09/18/2020       HCG (If Applicable): No results found for: PREGTESTUR, PREGSERUM, HCG, HCGQUANT     ABGs: No results found for: PHART, PO2ART, CLZ3IWV, ZIN9COQ, BEART, J2VHNCPT     Type & Screen (If Applicable):  No results found for: [facility]    Anesthesia Evaluation  Patient summary reviewed no history of anesthetic complications:   Airway: Mallampati: III  TM distance: >3 FB   Neck ROM: full  Mouth opening: > = 3 FB Dental:    (+) partials      Pulmonary:normal exam  breath sounds clear to auscultation      (-) asthma, recent URI, sleep apnea and not a current smoker          Patient did not smoke on day of surgery.                  Cardiovascular:  Exercise tolerance: good (>4 METS),   (+) hypertension:,     (-) pacemaker, past MI, CAD, CABG/stent and  angina    ECG reviewed  Rhythm: regular  Rate: normal           Beta Blocker:  Not on Beta Blocker         Neuro/Psych:      (-) seizures, TIA and CVA           GI/Hepatic/Renal:        (-) GERD, liver disease and no renal disease       Endo/Other:    (+) DiabetesType II DM, , .    (-) hypothyroidism, hyperthyroidism, blood dyscrasia               Abdominal:   (+) obese,         Vascular:

## 2020-09-24 LAB
ANION GAP SERPL CALCULATED.3IONS-SCNC: 13 MMOL/L (ref 7–19)
BUN BLDV-MCNC: 13 MG/DL (ref 8–23)
CALCIUM SERPL-MCNC: 9.8 MG/DL (ref 8.8–10.2)
CHLORIDE BLD-SCNC: 103 MMOL/L (ref 98–111)
CO2: 22 MMOL/L (ref 22–29)
CREAT SERPL-MCNC: 0.5 MG/DL (ref 0.5–0.9)
FOLATE: >20 NG/ML (ref 4.8–37.3)
GFR AFRICAN AMERICAN: >59
GFR NON-AFRICAN AMERICAN: >60
GLUCOSE BLD-MCNC: 113 MG/DL (ref 70–99)
GLUCOSE BLD-MCNC: 134 MG/DL (ref 74–109)
GLUCOSE BLD-MCNC: 151 MG/DL (ref 70–99)
GLUCOSE BLD-MCNC: 154 MG/DL (ref 70–99)
GLUCOSE BLD-MCNC: 189 MG/DL (ref 70–99)
HCT VFR BLD CALC: 34.1 % (ref 37–47)
HEMOGLOBIN: 10.7 G/DL (ref 12–16)
IRON SATURATION: 7 % (ref 14–50)
IRON: 21 UG/DL (ref 37–145)
PERFORMED ON: ABNORMAL
POTASSIUM REFLEX MAGNESIUM: 4.3 MMOL/L (ref 3.5–5)
SODIUM BLD-SCNC: 138 MMOL/L (ref 136–145)
TOTAL IRON BINDING CAPACITY: 299 UG/DL (ref 250–400)
VITAMIN B-12: 964 PG/ML (ref 211–946)

## 2020-09-24 PROCEDURE — 83550 IRON BINDING TEST: CPT

## 2020-09-24 PROCEDURE — 1210000000 HC MED SURG R&B

## 2020-09-24 PROCEDURE — 6360000002 HC RX W HCPCS: Performed by: ORTHOPAEDIC SURGERY

## 2020-09-24 PROCEDURE — 85018 HEMOGLOBIN: CPT

## 2020-09-24 PROCEDURE — 6370000000 HC RX 637 (ALT 250 FOR IP): Performed by: ORTHOPAEDIC SURGERY

## 2020-09-24 PROCEDURE — 85014 HEMATOCRIT: CPT

## 2020-09-24 PROCEDURE — 82746 ASSAY OF FOLIC ACID SERUM: CPT

## 2020-09-24 PROCEDURE — 36415 COLL VENOUS BLD VENIPUNCTURE: CPT

## 2020-09-24 PROCEDURE — 82607 VITAMIN B-12: CPT

## 2020-09-24 PROCEDURE — 6370000000 HC RX 637 (ALT 250 FOR IP): Performed by: PHYSICIAN ASSISTANT

## 2020-09-24 PROCEDURE — G0378 HOSPITAL OBSERVATION PER HR: HCPCS

## 2020-09-24 PROCEDURE — 97116 GAIT TRAINING THERAPY: CPT

## 2020-09-24 PROCEDURE — 2500000003 HC RX 250 WO HCPCS: Performed by: ORTHOPAEDIC SURGERY

## 2020-09-24 PROCEDURE — 82947 ASSAY GLUCOSE BLOOD QUANT: CPT

## 2020-09-24 PROCEDURE — 97161 PT EVAL LOW COMPLEX 20 MIN: CPT

## 2020-09-24 PROCEDURE — 83540 ASSAY OF IRON: CPT

## 2020-09-24 PROCEDURE — 80048 BASIC METABOLIC PNL TOTAL CA: CPT

## 2020-09-24 RX ORDER — FERROUS SULFATE 325(65) MG
325 TABLET ORAL
Status: DISCONTINUED | OUTPATIENT
Start: 2020-09-24 | End: 2020-09-25 | Stop reason: HOSPADM

## 2020-09-24 RX ADMIN — CLINDAMYCIN IN 5 PERCENT DEXTROSE 900 MG: 18 INJECTION, SOLUTION INTRAVENOUS at 00:16

## 2020-09-24 RX ADMIN — TRAMADOL HYDROCHLORIDE 50 MG: 50 TABLET, FILM COATED ORAL at 15:20

## 2020-09-24 RX ADMIN — RIVAROXABAN 10 MG: 10 TABLET, FILM COATED ORAL at 00:16

## 2020-09-24 RX ADMIN — MULTIPLE VITAMINS W/ MINERALS TAB 1 TABLET: TAB at 08:19

## 2020-09-24 RX ADMIN — RIVAROXABAN 10 MG: 10 TABLET, FILM COATED ORAL at 19:03

## 2020-09-24 RX ADMIN — OXYCODONE HYDROCHLORIDE 10 MG: 5 TABLET ORAL at 11:21

## 2020-09-24 RX ADMIN — OXYCODONE HYDROCHLORIDE 5 MG: 5 TABLET ORAL at 01:12

## 2020-09-24 RX ADMIN — Medication 2 G: at 05:44

## 2020-09-24 RX ADMIN — Medication 5000 UNITS: at 08:19

## 2020-09-24 RX ADMIN — Medication 400 MG: at 08:19

## 2020-09-24 RX ADMIN — OXYCODONE HYDROCHLORIDE 10 MG: 10 TABLET, FILM COATED, EXTENDED RELEASE ORAL at 08:19

## 2020-09-24 RX ADMIN — OXYCODONE HYDROCHLORIDE 5 MG: 5 TABLET ORAL at 05:44

## 2020-09-24 RX ADMIN — ACETAMINOPHEN 650 MG: 325 TABLET ORAL at 19:04

## 2020-09-24 RX ADMIN — DOCUSATE SODIUM 50 MG AND SENNOSIDES 8.6 MG 1 TABLET: 8.6; 5 TABLET, FILM COATED ORAL at 22:29

## 2020-09-24 RX ADMIN — OXYCODONE HYDROCHLORIDE 20 MG: 5 TABLET ORAL at 18:21

## 2020-09-24 RX ADMIN — OXYCODONE HYDROCHLORIDE AND ACETAMINOPHEN 500 MG: 500 TABLET ORAL at 08:19

## 2020-09-24 RX ADMIN — ACETAMINOPHEN 650 MG: 325 TABLET ORAL at 00:16

## 2020-09-24 RX ADMIN — TRAMADOL HYDROCHLORIDE 50 MG: 50 TABLET, FILM COATED ORAL at 00:16

## 2020-09-24 RX ADMIN — TRAMADOL HYDROCHLORIDE 50 MG: 50 TABLET, FILM COATED ORAL at 19:03

## 2020-09-24 RX ADMIN — OXYCODONE HYDROCHLORIDE 10 MG: 5 TABLET ORAL at 14:38

## 2020-09-24 RX ADMIN — TRAMADOL HYDROCHLORIDE 50 MG: 50 TABLET, FILM COATED ORAL at 05:43

## 2020-09-24 RX ADMIN — OXYCODONE HYDROCHLORIDE 10 MG: 10 TABLET, FILM COATED, EXTENDED RELEASE ORAL at 22:28

## 2020-09-24 RX ADMIN — DOCUSATE SODIUM 50 MG AND SENNOSIDES 8.6 MG 1 TABLET: 8.6; 5 TABLET, FILM COATED ORAL at 08:19

## 2020-09-24 RX ADMIN — CLINDAMYCIN IN 5 PERCENT DEXTROSE 900 MG: 18 INJECTION, SOLUTION INTRAVENOUS at 19:03

## 2020-09-24 RX ADMIN — ROSUVASTATIN CALCIUM 20 MG: 20 TABLET, FILM COATED ORAL at 22:29

## 2020-09-24 RX ADMIN — METFORMIN HYDROCHLORIDE 500 MG: 500 TABLET ORAL at 08:19

## 2020-09-24 RX ADMIN — CLINDAMYCIN IN 5 PERCENT DEXTROSE 900 MG: 18 INJECTION, SOLUTION INTRAVENOUS at 10:55

## 2020-09-24 RX ADMIN — FERROUS SULFATE TAB 325 MG (65 MG ELEMENTAL FE) 325 MG: 325 (65 FE) TAB at 10:55

## 2020-09-24 RX ADMIN — ACETAMINOPHEN 650 MG: 325 TABLET ORAL at 05:43

## 2020-09-24 ASSESSMENT — PAIN SCALES - GENERAL
PAINLEVEL_OUTOF10: 10
PAINLEVEL_OUTOF10: 10
PAINLEVEL_OUTOF10: 8
PAINLEVEL_OUTOF10: 9
PAINLEVEL_OUTOF10: 6
PAINLEVEL_OUTOF10: 8
PAINLEVEL_OUTOF10: 2
PAINLEVEL_OUTOF10: 4
PAINLEVEL_OUTOF10: 0
PAINLEVEL_OUTOF10: 10
PAINLEVEL_OUTOF10: 8
PAINLEVEL_OUTOF10: 7
PAINLEVEL_OUTOF10: 10

## 2020-09-24 NOTE — PROGRESS NOTES
Physical Therapy    Facility/Department: [facility] SURG SERVICES  Initial Assessment    NAME: Jovanny Anderson  : 1955  MRN: [id number]    Date of Service: 2020    Discharge Recommendations:  Continue to assess pending progress, Patient would benefit from continued therapy after discharge        Assessment   Body structures, Functions, Activity limitations: Decreased functional mobility ; Decreased endurance;Decreased ROM; Decreased strength; Increased pain  Assessment: Pt WOULD BENEFIT FROM SKILLED PT TO ADDRESS HER DEFICITS REALTED TO L TKR  Prognosis: Good  Decision Making: Low Complexity  PT Education: Gait Training;General Safety; Functional Mobility Training;Weight-bearing Education  REQUIRES PT FOLLOW UP: Yes  Activity Tolerance  Activity Tolerance: Patient Tolerated treatment well       Patient Diagnosis(es): There were no encounter diagnoses. has a past medical history of Diabetes mellitus ([facility] [address] Essential hypertension, Hyperlipidemia, Hypertension, and Type 2 diabetes mellitus with hyperglycemia, without long-term current use of insulin ([facility] [address] has a past surgical history that includes Total knee arthroplasty (Right); Tubal ligation; Carpal tunnel release (Left); Revision total knee arthroplasty (Right, 2019); and Total knee arthroplasty (Left, 2020). Restrictions  Restrictions/Precautions  Restrictions/Precautions: Weight Bearing  Lower Extremity Weight Bearing Restrictions  Left Lower Extremity Weight Bearing: Weight Bearing As Tolerated  Vision/Hearing        Subjective  General  Diagnosis: L TKR  Follows Commands: Within Functional Limits  Subjective  Subjective: pt STATES SHE IS READY TO GET OOB. RN PRESENT WITH PAIN MEDS.           Orientation     Social/Functional History  Social/Functional History  Home Access: Stairs to enter with rails  Entrance Stairs - Number of Steps: 3  Bathroom Equipment: 3-in-1 commode  Home Equipment: Rolling walker  ADL Assistance: Independent  Ambulation Assistance: Independent  Transfer Assistance: Independent  Cognition        Objective          AROM RLE (degrees)  RLE AROM: WFL  AROM LLE (degrees)  LLE General AROM: ABLE TO FLEX KNEE TO GROSSLY 80 DEG  Strength RLE  Strength RLE: WFL  Strength LLE  Comment: ABLE TO EXTEND KNEE AGAINST GRAVITY        Bed mobility  Supine to Sit: Stand by assistance  Transfers  Sit to Stand: Contact guard assistance  Stand to sit: Contact guard assistance  Ambulation  Ambulation?: Yes  Ambulation 1  Surface: level tile  Device: Rolling Walker  Assistance: Contact guard assistance  Quality of Gait: ANTALGIC  Gait Deviations: Slow Yvonne;Decreased step length;Decreased step height  Distance: 20 FT              Plan   Plan  Times per week: 5-7  Plan weeks: 2  Current Treatment Recommendations: Strengthening, Safety Education & Training, Functional Mobility Training, Transfer Training, Positioning, Pain Management, Gait Training  Plan Comment: WBAT  Safety Devices  Type of devices: Call light within reach, Gait belt, Left in chair    G-Code       OutComes Score                                                  AM-PAC Score             Goals  Short term goals  Time Frame for Short term goals: 2 WKS  Short term goal 1:  FT WITH RW SBA  Short term goal 2: STEPS, CGA  Patient Goals   Patient goals : D/C HOME       Therapy Time   Individual Concurrent Group Co-treatment   Time In           Time Out           Minutes                   Qing Han PT    Electronically signed by Qing Han PT on 9/24/2020 at 9:06 AM

## 2020-09-24 NOTE — CARE COORDINATION
Spoke with patient regarding MD orders for [facility] services. Pt agreeable and chose [facility]. Spoke with Francis Mane. Unable to accept due to staffing issues. Unable to staff [facility] at this time. 2nd choice was [facility]. Spoke with Rachelle Verduzco. Referral accepted and faxed. Please notify [facility] when patient discharges and Fax DC Summary,  DC med list and any new [facility] orders. [facility]  [phone]   Fax  [phone]. The Patient  was provided with a choice of provider and agrees with the discharge plan. [x] Yes [] No    Freedom of choice list was provided with basic dialogue that supports the patient's individualized plan of care/goals, treatment preferences and shares the quality data associated with the providers.  [x] Yes [] No  Electronically signed by Dilma Malone RN on 9/24/20 at 10:49 AM CDT

## 2020-09-24 NOTE — PROGRESS NOTES
Subjective:     Post-Operative Day: 1 Status Post left tka complex  Systemic or Specific Complaints:No Complaints  no nausea Pain 5    Objective:     Patient Vitals for the past 24 hrs:   BP Temp Temp src Pulse Resp SpO2 Height Weight   09/24/20 0629 132/66 98.2 °F (36.8 °C) Temporal 82 16 94 % -- --   09/24/20 0341 115/67 97.4 °F (36.3 °C) Temporal 79 18 96 % -- --   09/23/20 2311 120/66 97.5 °F (36.4 °C) Temporal 82 16 94 % -- --   09/23/20 2147 115/64 97.2 °F (36.2 °C) Temporal 83 16 96 % -- --   09/23/20 1931 119/67 96.5 °F (35.8 °C) Temporal 94 16 100 % -- --   09/23/20 1740 137/79 96.4 °F (35.8 °C) Temporal 75 14 100 % -- --   09/23/20 1723 -- -- -- 73 14 96 % -- --   09/23/20 1720 133/64 -- -- 69 13 95 % -- --   09/23/20 1715 (!) 129/59 97.1 °F (36.2 °C) Temporal 74 14 96 % -- --   09/23/20 1710 (!) 116/59 -- -- 74 13 96 % -- --   09/23/20 1705 104/63 -- -- 77 10 90 % -- --   09/23/20 1700 (!) 124/57 -- -- 80 14 93 % -- --   09/23/20 1655 (!) 128/57 -- -- 82 12 92 % -- --   09/23/20 1653 (!) 128/57 98 °F (36.7 °C) Temporal 84 12 92 % -- --   09/23/20 1651 -- 97.5 °F (36.4 °C) -- -- -- -- -- --   09/23/20 1254 (!) 148/69 98.1 °F (36.7 °C) Infrared 78 14 97 % 5' 3\" (1.6 m) 217 lb (98.4 kg)       General: alert, appears stated age and cooperative   Exam: Incision clean, dry, and intact, no evidence of infection. Neurovascular: Exam normal       Data Review:  Recent Labs     09/24/20  0354   HGB 10.7*     Recent Labs     09/24/20  0354      K 4.3   CREATININE 0.5     Recent Labs     09/24/20  0354   LABGLOM >60           Assessment:     Status Post left tka complex. Plan:     Continues current post-op course.       Electronically signed by Whitney Pryor MD on 9/24/2020 at 7:14 AM

## 2020-09-24 NOTE — CONSULTS
Referring Provider: Dr. Gail Chan  Reason for Consultation: Medical management    Patient Care Team:  Austin Oakley as PCP - General    Chief complaint left knee pain    Subjective . History of present illness: The patient presents to the orthopedic service for TKA. They have failed outpatient conservative treatment of NSAIDS, muscle relaxer, physical therapy and opioid pain meds. The pain is affecting their activities of daily living and they have chosen to undergo surgical correction. We have been asked to provide medical consultation by the attending physician since their primary care provider does not attend here at [address] [place] [address] in their healthcare during the perioperative phase was discussed with the patient. All questions were encouraged and answered to the best of our ability. The postoperative pain is as expected. There are no other participating or relieving factors noted. Pleasant 78-year-old female resides in [place] [address]. She underwent right TKA February 3044 without complications. She is doing well postoperatively. She has a history of diabetes mellitus type 2, essential hypertension, mixed dyslipidemia and osteoarthritis. She did have perioperative hypotension which has resolved. She denies any symptoms related to such. She is ready and willing to work with therapy in anticipation for discharge tomorrow.     REVIEW OF SYSTEMS:    CONSTITUTIONAL:  Negative for anorexia, chills, fevers, night sweats and weight loss  EYES:  negative for eye dryness, icterus and redness  HEENT:   negative for dental problems, epistaxis, facial trauma and thrush  RESPIRATORY:  negative for chest tightness, cough, dyspnea on exertion, pneumonia and sputum  CARDIOVASCULAR: negative for chest pain, dyspnea, exertional chest pressure/discomfort, irregular heart beat, palpitations, paroxysmal nocturnal dyspnea and syncope  GASTROINTESTINAL:  negative for abdominal pain, hematemesis, jaundice, melena and rectal bleeding  MUSCULOSKELETAL:  negative for muscle weakness, myalgias and neck pain, chronic joint pain noted  NEUROLOGICAL:   negative for dizziness, headaches, seizures, speech problems, tremors and vertigo  INTEGUMENT: negative for pruritus, rash, skin color change and skin lesion(s)   A Full 14 point review of systems is negative outside those listed above and in the HPI      History    Past Medical History:   Diagnosis Date    Diabetes mellitus ([facility] [address]     Essential hypertension 9/23/2020    Hyperlipidemia     Hypertension     Type 2 diabetes mellitus with hyperglycemia, without long-term current use of insulin [facility] 9/23/2020     Past Surgical History:   Procedure Laterality Date    CARPAL TUNNEL RELEASE Left     REVISION TOTAL KNEE ARTHROPLASTY Right 2/1/2019    RIGHT KNEE REVISION performed by Usman Jj MD at [address] [place] [address] TOTAL KNEE ARTHROPLASTY Right     TUBAL LIGATION       Family History   Problem Relation Age of Onset    Stroke Mother     Heart Attack Father 76        MI     Social History     Tobacco Use    Smoking status: Never Smoker    Smokeless tobacco: Never Used   Substance Use Topics    Alcohol use: No    Drug use: Not on file     Medications Prior to Admission: amLODIPine (NORVASC) 5 MG tablet, Take 5 mg by mouth daily  meloxicam (MOBIC) 15 MG tablet, Take 15 mg by mouth daily  acetaminophen (TYLENOL) 500 MG tablet, Take 1,000 mg by mouth every 6 hours as needed for Pain  metFORMIN (GLUCOPHAGE) 500 MG tablet, Take 500 mg by mouth daily (with breakfast)   cloNIDine (CATAPRES) 0.1 MG tablet, Take 0.1 mg by mouth daily  rosuvastatin (CRESTOR) 20 MG tablet, Take 20 mg by mouth nightly  Cholecalciferol (VITAMIN D3) 5000 units TABS, Take 1 tablet by mouth daily   Magnesium 400 MG CAPS, Take 400 mg by mouth daily   Multiple Vitamins-Minerals (CENTRUM SILVER 50+WOMEN) TABS, Take 1 tablet by mouth daily   Omega-3 Fatty Acids (FISH OIL) 1000 MG CAPS, Take 1,000 mg by mouth daily  vitamin C (ASCORBIC ACID) 500 MG tablet, Take 500 mg by mouth daily  Potassium 95 MG TABS, Take 95 mg by mouth daily   Codeine  Objective     Vital Signs   All pre-op and post op vitals reviewed           Physical Exam:  Constitutional: oriented to person, place, and time. appears well-developed. HEENT:   Head: Normocephalic and atraumatic. Eyes: Pupils are equal, round, and reactive to light. Neck: Neck supple. Cardiovascular: Regular rhythm and normal heart sounds. No lift or rub appreciated. Pulmonary/Chest: Effort normal and breath sounds normal. CTAB. No labored breating. Abdominal: Soft. Bowel sounds are normal. There is no appreciable  distension. There is no point tenderness. no rebounding or guarding. Musculoskeletal: Normal range of motion on other than surgically repaired joint . no edema or tenderness other than surgical area. Post-op changes noted  Neurological:  alert and oriented to person, place, and time. normal reflexes. No focal deficits  Skin: Skin is warm and dry. No new rashes appreciated. Results Review:   I reviewed the patient's new imaging results and agree with the interpretation. Active Problems: Treatment recommendations    Primary osteoarthritis of left knee--postop day #1    Type 2 diabetes mellitus with hyperglycemia--metformin 500 mg daily    Essential hypertension--Norvasc Catapres    Gastroesophageal reflux disease without esophagitis    Drug-induced constipation    Iron deficient anemia--orders for replacement    Hypokalemia--continue with daily potassium replacement    Medically stable postop day #1  Encourage incentive spirometry every hour while awake  Wound care monitor for infection  Early mobilization, maximize efforts with therapy       Anemia post-op expected-check iron, B12,and folate if indicated. Replenish substrates as needed. Transfuse at acceptable levels depending on clinical judgement and comorbidities.  Recent AM

## 2020-09-24 NOTE — OP NOTE
[facility] [employer] [facility] [address], [address] [facility]                                OPERATIVE REPORT    PATIENT NAME: Marta Moore                      :        1955  MED REC NO:   [id number]                              ROOM:       [facility]  ACCOUNT NO:   [de-identified]                           ADMIT DATE: 2020  PROVIDER:     Juan Kaiser MD    DATE OF PROCEDURE:  2020    PREOPERATIVE DIAGNOSIS:  Primary osteoarthritis, left knee with severe  varus deformity. POSTOPERATIVE DIAGNOSIS:  Primary osteoarthritis, left knee with severe  varus deformity. PROCEDURE PERFORMED:  Complex primary left total knee replacement. Please note, complexity of the surgery is due to the fact that the  patient has severe varus deformity, which required the use of extensive  medial releases. This added about 75% increase in difficulty of the  case and placement of implants. She also had a 16-degree varus  deformity of the left knee. SURGEON:  Juan Kaiser MD    ANESTHESIA:  Spinal with adductor canal block. EBL:  100 mL. FLUIDS:  1600 mL of crystalloids. URINE OUTPUT:  350 mL. TOURNIQUET TIME:  69 minutes. COMPONENTS USED:  Aliyah Persona knee replacement system, femur size 6  narrow, tibia size D with a 14 x 30 stem extension, polyethylene size  10, patella size 38. INDICATIONS:  A 36-year-old lady with severe varus deformity and a fixed  16-degree varus on x-ray. Because of this, she elected for the above  procedure. OPERATIVE PROCEDURE:  After informed consent, she was given 2 gm of  Ancef, 1 gm of tranexamic acid, underwent adductor canal block and  spinal anesthetic. Ortega catheter was inserted. Tourniquet was placed  around the left upper thigh. The right leg was placed in SCD. The left  leg was prepped and draped in the usual sterile fashion. The leg was  Esmarched. Tourniquet was inflated to 200 mmHg.   Midline incision was  made. Parapatellar approach was made in the knee. Prepatellar fat pad  was partially excised. Synovium on the distal femur was elevated. Intramedullary canal of the femur was drilled into. Our distal  resection was made in 4 degrees of valgus taking a +0 cut. Medial  resection was 7 mm, lateral resection was 6.5 mm. We sized this to a  size 7 femur. This was placed in 5 degrees of external rotation. Our  anterior cut was made. We then assessed this and moved the block  posteriorly by 2 mm. We then made our chamfer and posterior cuts. Posteromedial cut was 7 mm. Posterolateral cut was _____ mm. We then  exposed the tibia. The patient had a very large defect medially. A  thorough release was done around the proximal medial tibia. We then  used our 7-degree cutting guide and placed this in line with the  anterior tibial crest and our tibial resection was made. We then had  difficulty reducing with spacer blocks, so we did an additional +4  tibial resection and then I did a thorough release all the way around  the posterior medial tibial plateau. We then made our CPS box cut and  then did a trial reduction, which showed that we were tighter in flexion  and extension, so we downsized the femur to a size 6 femur by making our  posterior cuts, then repeated trial reductions, which now showed  excellent flexion and extension and very stable knee. We then drilled,  measured for a stem extension. We used our keel punch. Cement  restrictor was placed on the tibial canal and the trial tibia fit  nicely. The patella measured 23 mm in thickness. The reaming system  was used. We sized this to a size 38 mm patella. Lug holes were  drilled and the trial button fit very nicely. We then mixed 20 mL of  Exparel with 30 mL of saline and 50 mL of 0.25% Marcaine. We injected  the posteromedial capsule with 40 mL and 60 mL in the subcutaneous  tissues. We then mixed two batches of Palacos G cement.   The size D  tibia with a 14 x 30 stem extension was cemented followed by size 6  narrow femoral component. Trial liner was placed and the size 38 mm  patella was cemented. Once the cement had hardened, tourniquet was  released and hemostasis was achieved. We irrigated with another 1500 mL  of normal saline. I did not feel I needed to use the CPS polyethylene  even though we cut for the CPS box. We then placed our final size 10  liner and locked this in the tibial tray. We had full extension of the  knee, flexion about 125 degrees, limited only by her thigh touching the  calf with perfect midline tracking of the patella. In about 10 degrees  of flexion, _____ 1 mm of opening medially and about 3 to 4 laterally,  but we did alleviate the fixed varus deformity. Parapatellar approach  was closed with interrupted #1 Vicryl suture, 2-0 Vicryl suture for  subcutaneous tissues and 2-0 Vicryl for subcuticular stitch. Prineo  Dermabond and soft dressings were applied. The patient was taken to the  recovery room in stable condition. POSTOPERATIVE PLANS:  1. She will be on our typical total knee arthroplasty protocol. 2.  She will be on 2 doses of Ancef and 6 doses of clindamycin. 3.  She will be on Xarelto 10 mg a day for 21 days followed by  enteric-coated aspirin 325 mg twice a day for another 3 weeks. Please note her preoperative range of motion of the left knee was 3 to  70 degrees with a 16-degree radiographic varus deformity.         Onesimo Staley MD    D: 09/23/2020 17:41:28      T: 09/24/2020 0:21:11     [email or web address]  Job#: [id number]     Doc#: [id number]    CC:

## 2020-09-25 VITALS
HEIGHT: 63 IN | HEART RATE: 100 BPM | WEIGHT: 217 LBS | RESPIRATION RATE: 14 BRPM | BODY MASS INDEX: 38.45 KG/M2 | DIASTOLIC BLOOD PRESSURE: 69 MMHG | OXYGEN SATURATION: 95 % | SYSTOLIC BLOOD PRESSURE: 134 MMHG | TEMPERATURE: 96.6 F

## 2020-09-25 PROBLEM — M17.12 PRIMARY LOCALIZED OSTEOARTHRITIS OF LEFT KNEE: Status: ACTIVE | Noted: 2020-09-25

## 2020-09-25 LAB
ANION GAP SERPL CALCULATED.3IONS-SCNC: 14 MMOL/L (ref 7–19)
BUN BLDV-MCNC: 11 MG/DL (ref 8–23)
CALCIUM SERPL-MCNC: 9.5 MG/DL (ref 8.8–10.2)
CHLORIDE BLD-SCNC: 101 MMOL/L (ref 98–111)
CO2: 22 MMOL/L (ref 22–29)
CREAT SERPL-MCNC: 0.6 MG/DL (ref 0.5–0.9)
GFR AFRICAN AMERICAN: >59
GFR NON-AFRICAN AMERICAN: >60
GLUCOSE BLD-MCNC: 129 MG/DL (ref 70–99)
GLUCOSE BLD-MCNC: 145 MG/DL (ref 74–109)
GLUCOSE BLD-MCNC: 153 MG/DL (ref 70–99)
HCT VFR BLD CALC: 29.5 % (ref 37–47)
HEMOGLOBIN: 9.3 G/DL (ref 12–16)
PERFORMED ON: ABNORMAL
PERFORMED ON: ABNORMAL
POTASSIUM REFLEX MAGNESIUM: 4.1 MMOL/L (ref 3.5–5)
SODIUM BLD-SCNC: 137 MMOL/L (ref 136–145)

## 2020-09-25 PROCEDURE — 6370000000 HC RX 637 (ALT 250 FOR IP): Performed by: ORTHOPAEDIC SURGERY

## 2020-09-25 PROCEDURE — 80048 BASIC METABOLIC PNL TOTAL CA: CPT

## 2020-09-25 PROCEDURE — 97530 THERAPEUTIC ACTIVITIES: CPT

## 2020-09-25 PROCEDURE — 97116 GAIT TRAINING THERAPY: CPT

## 2020-09-25 PROCEDURE — 2500000003 HC RX 250 WO HCPCS: Performed by: ORTHOPAEDIC SURGERY

## 2020-09-25 PROCEDURE — G0379 DIRECT REFER HOSPITAL OBSERV: HCPCS

## 2020-09-25 PROCEDURE — 85014 HEMATOCRIT: CPT

## 2020-09-25 PROCEDURE — 36415 COLL VENOUS BLD VENIPUNCTURE: CPT

## 2020-09-25 PROCEDURE — 82947 ASSAY GLUCOSE BLOOD QUANT: CPT

## 2020-09-25 PROCEDURE — 85018 HEMOGLOBIN: CPT

## 2020-09-25 PROCEDURE — 6370000000 HC RX 637 (ALT 250 FOR IP): Performed by: PHYSICIAN ASSISTANT

## 2020-09-25 PROCEDURE — G0378 HOSPITAL OBSERVATION PER HR: HCPCS

## 2020-09-25 RX ORDER — OXYCODONE HYDROCHLORIDE 5 MG/1
5 TABLET ORAL EVERY 4 HOURS PRN
Qty: 60 TABLET | Refills: 0
Start: 2020-09-25 | End: 2020-10-25

## 2020-09-25 RX ADMIN — MULTIPLE VITAMINS W/ MINERALS TAB 1 TABLET: TAB at 09:54

## 2020-09-25 RX ADMIN — Medication 5000 UNITS: at 09:54

## 2020-09-25 RX ADMIN — OXYCODONE HYDROCHLORIDE 10 MG: 5 TABLET ORAL at 09:53

## 2020-09-25 RX ADMIN — OXYCODONE HYDROCHLORIDE 10 MG: 10 TABLET, FILM COATED, EXTENDED RELEASE ORAL at 09:54

## 2020-09-25 RX ADMIN — DOCUSATE SODIUM 50 MG AND SENNOSIDES 8.6 MG 1 TABLET: 8.6; 5 TABLET, FILM COATED ORAL at 09:54

## 2020-09-25 RX ADMIN — CLINDAMYCIN IN 5 PERCENT DEXTROSE 900 MG: 18 INJECTION, SOLUTION INTRAVENOUS at 02:20

## 2020-09-25 RX ADMIN — ACETAMINOPHEN 650 MG: 325 TABLET ORAL at 06:23

## 2020-09-25 RX ADMIN — METFORMIN HYDROCHLORIDE 500 MG: 500 TABLET ORAL at 09:54

## 2020-09-25 RX ADMIN — CLINDAMYCIN IN 5 PERCENT DEXTROSE 900 MG: 18 INJECTION, SOLUTION INTRAVENOUS at 09:53

## 2020-09-25 RX ADMIN — TRAMADOL HYDROCHLORIDE 50 MG: 50 TABLET, FILM COATED ORAL at 01:09

## 2020-09-25 RX ADMIN — TRAMADOL HYDROCHLORIDE 50 MG: 50 TABLET, FILM COATED ORAL at 06:23

## 2020-09-25 RX ADMIN — Medication 400 MG: at 09:53

## 2020-09-25 RX ADMIN — FERROUS SULFATE TAB 325 MG (65 MG ELEMENTAL FE) 325 MG: 325 (65 FE) TAB at 09:54

## 2020-09-25 RX ADMIN — ACETAMINOPHEN 650 MG: 325 TABLET ORAL at 01:09

## 2020-09-25 RX ADMIN — OXYCODONE HYDROCHLORIDE AND ACETAMINOPHEN 500 MG: 500 TABLET ORAL at 09:54

## 2020-09-25 ASSESSMENT — PAIN SCALES - GENERAL
PAINLEVEL_OUTOF10: 5
PAINLEVEL_OUTOF10: 3
PAINLEVEL_OUTOF10: 5

## 2020-09-25 NOTE — PROGRESS NOTES
Subjective:     Post-Operative Day: 2 Status Post left tka complex primary due to severe varus deformity. Pt is awake and alert. Ox3. No new issues. She has done fairly well with PT. Systemic or Specific Complaints: No Complaints  no nausea Pain 3    Objective:     Patient Vitals for the past 24 hrs:   BP Temp Temp src Pulse Resp SpO2   09/25/20 1015 134/69 96.6 °F (35.9 °C) Temporal 100 14 95 %   09/25/20 0705 -- -- -- -- 16 94 %   09/25/20 0626 137/65 97.3 °F (36.3 °C) Temporal 99 14 93 %   09/24/20 2210 122/65 98.5 °F (36.9 °C) Temporal 90 16 93 %   09/24/20 1811 131/60 96.9 °F (36.1 °C) Temporal 93 16 94 %   09/24/20 1415 (!) 108/58 97.4 °F (36.3 °C) Temporal 92 14 96 %       General: alert, appears stated age and cooperative   Exam: Incision clean, dry, and intact, no evidence of infection. Fair active motion to left lower extremity   Neurovascular: Exam normal       Data Review:  Recent Labs     09/24/20  0354 09/25/20  0355   HGB 10.7* 9.3*     Recent Labs     09/25/20  0355      K 4.1   CREATININE 0.6     Recent Labs     09/25/20  0355   LABGLOM >60           Assessment:     Status Post left tka complex. Plan:     Continue current post-op course. Pt is ready for discharge to home with home health as per total knee protocol. She may f/u in office in 6 weeks.       Electronically signed by Janis Velarde PA-C on 9/25/2020 at 10:25 AM

## 2020-09-25 NOTE — DISCHARGE SUMMARY
[facility] [place] [address]  Dr. Juan Kaiser  Discharge Summary     Campbell Henderson is a 60 y.o. female underwent left total knee replacement procedure without complication. Campbell Henderson was admitted to the floor following her   recovery in the PACU.      Discharge Diagnosis   Left Knee Replacement    Current Inpatient Medications    Current Facility-Administered Medications: ferrous sulfate (IRON 325) tablet 325 mg, 325 mg, Oral, Daily with breakfast  vitamin D (CHOLECALCIFEROL) capsule 5,000 Units, 5,000 Units, Oral, Daily  metFORMIN (GLUCOPHAGE) tablet 500 mg, 500 mg, Oral, Daily with breakfast  Potassium TABS 95 mg, 95 mg, Oral, Daily  rosuvastatin (CRESTOR) tablet 20 mg, 20 mg, Oral, Nightly  vitamin C (ASCORBIC ACID) tablet 500 mg, 500 mg, Oral, Daily  0.9 % sodium chloride infusion, , Intravenous, Continuous  0.9 % sodium chloride bolus, 500 mL, Intravenous, PRN  sodium chloride flush 0.9 % injection 10 mL, 10 mL, Intravenous, 2 times per day  sodium chloride flush 0.9 % injection 10 mL, 10 mL, Intravenous, PRN  acetaminophen (TYLENOL) tablet 650 mg, 650 mg, Oral, Q6H  sennosides-docusate sodium (SENOKOT-S) 8.6-50 MG tablet 1 tablet, 1 tablet, Oral, BID  magnesium hydroxide (MILK OF MAGNESIA) 400 MG/5ML suspension 30 mL, 30 mL, Oral, Daily PRN  glucose (GLUTOSE) 40 % oral gel 15 g, 15 g, Oral, PRN  dextrose 50 % IV solution, 12.5 g, Intravenous, PRN  glucagon (rDNA) injection 1 mg, 1 mg, Intramuscular, PRN  dextrose 5 % solution, 100 mL/hr, Intravenous, PRN  ondansetron (ZOFRAN-ODT) disintegrating tablet 4 mg, 4 mg, Oral, Q8H PRN **OR** ondansetron (ZOFRAN) injection 4 mg, 4 mg, Intravenous, Q6H PRN  rivaroxaban (XARELTO) tablet 10 mg, 10 mg, Oral, Daily  insulin lispro (HUMALOG) injection vial 0-6 Units, 0-6 Units, Subcutaneous, TID WC  insulin lispro (HUMALOG) injection vial 0-3 Units, 0-3 Units, Subcutaneous, Nightly  oxyCODONE (OXYCONTIN) extended release tablet 10 mg, 10 mg, Oral, 2 times per day  oxyCODONE (ROXICODONE) immediate release tablet 10 mg, 10 mg, Oral, Q4H PRN **OR** oxyCODONE (ROXICODONE) immediate release tablet 20 mg, 20 mg, Oral, Q4H PRN  diazePAM (VALIUM) tablet 5 mg, 5 mg, Oral, Q6H PRN  HYDROmorphone HCl PF (DILAUDID) injection 0.5 mg, 0.5 mg, Intravenous, Q3H PRN **OR** HYDROmorphone HCl PF (DILAUDID) injection 1 mg, 1 mg, Intravenous, Q3H PRN  diphenhydrAMINE (BENADRYL) tablet 25 mg, 25 mg, Oral, Q6H PRN  lactated ringers infusion, , Intravenous, Continuous  magnesium oxide (MAG-OX) tablet 400 mg, 400 mg, Oral, Daily  traMADol (ULTRAM) tablet 50 mg, 50 mg, Oral, Q6H  therapeutic multivitamin-minerals 1 tablet, 1 tablet, Oral, Daily  polyethylene glycol (GLYCOLAX) packet 17 g, 17 g, Oral, BID  oxyCODONE (ROXICODONE) immediate release tablet 5 mg, 5 mg, Oral, Q4H PRN **OR** [DISCONTINUED] oxyCODONE (ROXICODONE) immediate release tablet 10 mg, 10 mg, Oral, Q4H PRN    Post-operatively the patients diet was advanced as tolerated and their incision was checked on POD #1. The incision was clean, dry and intact with no signs of infection. The patient remained neurovascularly intact in the lower extremity and had intact pulses distally. Patients calf remained soft and showed no evidence of DVT. The patient was able to move her left leg and ankle/foot without any problems post-operatively. Physical therapy and occupational therapy were consulted and began working with the patient post-operatively. The patient progressed with PT/OT as would be expected and continued to improve through their stay. The patients pain was initially controlled with IV medications but we were able to transition to oral pain medications soon after arrival to the floor and their pain remained under good control through their hospital stay. From a medical standpoint the patient remained stable and continued to have the medicine team follow throughout their stay.  Acute postoperative blood loss anemia after

## 2020-09-25 NOTE — PROGRESS NOTES
Patient discharged home today with [facility]. Medications and discharge instructions reviewed with patient. A handout of all new medications was given to the patient for reference with all possible side effects highlighted. Patient verbalized understanding. Patient stable upon discharge.   Electronically signed by Jose Walter RN on 9/25/2020 at 12:12 PM

## 2020-09-25 NOTE — PROGRESS NOTES
Physical Therapy  Name: Jovanny Anderson  MRN:  [id number]  Date of service:  9/25/2020 09/25/20 1400   Restrictions/Precautions   Restrictions/Precautions Weight Bearing   Lower Extremity Weight Bearing Restrictions   Left Lower Extremity Weight Bearing Weight Bearing As Tolerated   General   Chart Reviewed Yes   Subjective   Subjective Pt agreeable to therapy, ready to get up and walk and then wishes to get in the bed. Pain Screening   Patient Currently in Pain Yes   Pain Assessment   Pain Assessment 0-10   Pain Level 3   Response to Pain Intervention Patient Satisfied   Pain Type Surgical pain   Pain Location Knee  (R)   Pain Descriptors Aching   Non-Pharmaceutical Pain Intervention(s) Cold applied   Bed Mobility   Sit to Supine Stand by assistance   Scooting Independent   Transfers   Sit to Stand Contact guard assistance   Stand to sit Contact guard assistance   Ambulation   Ambulation? Yes   WB Status WBAT   Ambulation 1   Surface level tile   Device Rolling Walker   Assistance Contact guard assistance   Quality of Gait antalgic   Gait Deviations Slow Yvonne;Decreased step length;Decreased step height   Distance 50'   Comments Pt amb with antalgic gait pattern RLE, says her pain does not get above a 3/10 while amb   Short term goals   Time Frame for Short term goals 2 WKS   Short term goal 1  FT WITH RW, SBA   Short term goal 2 STEPS, CGA   Conditions Requiring Skilled Therapeutic Intervention   Body structures, Functions, Activity limitations Decreased functional mobility ; Decreased endurance;Decreased ROM; Decreased strength; Increased pain   Assessment Pt tolerated tx well, amb 50' and trans to bed with all needs in reach   Activity Tolerance   Activity Tolerance Patient Tolerated treatment well   Safety Devices   Type of devices Call light within reach;Gait belt;Left in bed         Electronically signed by Renee Hendrickson PTA on 9/25/2020 at 2:06 PM

## 2020-09-25 NOTE — PROGRESS NOTES
Patient discharged home today with [facility]. Facility notified of patient's discharge and all documents were faxed including Dr. Priya Perez Protocol for knees. P ; [phone].   Electronically signed by Terrell Ward RN on 9/25/2020 at 12:11 PM

## 2020-09-25 NOTE — PROGRESS NOTES
Physical Therapy  Saeid Dex  [id number]     09/25/20 0759   Subjective   Subjective Attempt, patient up in chair. Patient just to/from the [facility] with PCA assist and asked to walk later with therapy.    Electronically signed by Jeovany Barakat PTA on 9/25/2020 at 8:02 AM

## 2020-09-25 NOTE — PROGRESS NOTES
[facility]  Daily Progress Note  Josh Baker  MRN: [id number] LOS: 2    Admit Date: 9/23/2020 9/25/2020 7:15 AM          Chief Complaint:  Left knee pain    Interval History:    Reviewed overnight events and nursing notes  Postoperative pain is controlled  Denies chest pain  No shortness of breath  Appetite and bowel function returning.        DIET:  DIET GENERAL; Carb Control: 4 carb choices (60 gms)/meal    Medications:      sodium chloride 150 mL/hr at 09/23/20 1822    dextrose      lactated ringers 100 mL/hr at 09/23/20 1413      ferrous sulfate  325 mg Oral Daily with breakfast    vitamin D  5,000 Units Oral Daily    metFORMIN  500 mg Oral Daily with breakfast    Potassium  95 mg Oral Daily    rosuvastatin  20 mg Oral Nightly    vitamin C  500 mg Oral Daily    sodium chloride flush  10 mL Intravenous 2 times per day    acetaminophen  650 mg Oral Q6H    sennosides-docusate sodium  1 tablet Oral BID    rivaroxaban  10 mg Oral Daily    insulin lispro  0-6 Units Subcutaneous TID WC    insulin lispro  0-3 Units Subcutaneous Nightly    oxyCODONE  10 mg Oral 2 times per day    clindamycin (CLEOCIN) IV  900 mg Intravenous Q8H    magnesium oxide  400 mg Oral Daily    traMADol  50 mg Oral Q6H    therapeutic multivitamin-minerals  1 tablet Oral Daily    polyethylene glycol  17 g Oral BID       Data:     Code Status: Full Code    Family History   Problem Relation Age of Onset    Stroke Mother     Heart Attack Father 76        MI     Social History     Socioeconomic History    Marital status:      Spouse name: Not on file    Number of children: Not on file    Years of education: Not on file    Highest education level: Not on file   Occupational History    Not on file   Social Needs    Financial resource strain: Not on file    Food insecurity     Worry: Not on file     Inability: Not on file    Transportation needs     Medical: Not on file     Non-medical: Not on file Reviewed the last 24 hours of labs and x-rays that are available. Updated overnight status with nursing staff. Reviewed the case with Benjie Hong PA-C. All questions were answered to the patient's/family's understanding. Patient is medically stable, please see orders for further details. We were asked to provide medical consultation by the attending physician during the perioperative phase. They will return to their primary care provider and have been instructed to follow up with them concerning abnormal lab/x-rays. Questions were encouraged and answered to the best of our ability. We will be available for 30 days or until they return to their primary care provider, if they return to the emergency room in the next 30 days with a nick-operative issue we will gladly admit them. Otherwise, they will be admitted to the hospitalist service. All questions and concerns addressed prior to discharge. I have discussed the care of Suzie Koyanagi, including pertinent history and exam findings with the ARNP/PA. I have seen and examined the patient and the key elements of all parts of the encounter have been performed by me. I agree with the assessment and plan as outlined by the ARNP/PA. Please refer to my separate note for complete documentation.      Electronically signed by Anju Sousa MD on 9/25/2020 at 8:26 AM

## 2020-09-26 LAB
BLOOD BANK DISPENSE STATUS: NORMAL
BLOOD BANK DISPENSE STATUS: NORMAL
BLOOD BANK PRODUCT CODE: NORMAL
BLOOD BANK PRODUCT CODE: NORMAL
BPU ID: NORMAL
BPU ID: NORMAL
DESCRIPTION BLOOD BANK: NORMAL
DESCRIPTION BLOOD BANK: NORMAL

## 2020-09-29 ENCOUNTER — TELEPHONE (OUTPATIENT)
Dept: INPATIENT UNIT | Age: 65
End: 2020-09-29

## 2021-02-09 ENCOUNTER — TRANSCRIBE ORDERS (OUTPATIENT)
Dept: PHYSICAL THERAPY | Facility: CLINIC | Age: 66
End: 2021-02-09

## 2021-02-09 DIAGNOSIS — M62.81 MUSCLE WEAKNESS (GENERALIZED): Primary | ICD-10-CM

## 2021-02-17 ENCOUNTER — TREATMENT (OUTPATIENT)
Dept: PHYSICAL THERAPY | Facility: CLINIC | Age: 66
End: 2021-02-17

## 2021-02-17 DIAGNOSIS — R53.1 DECREASED STRENGTH, ENDURANCE, AND MOBILITY: ICD-10-CM

## 2021-02-17 DIAGNOSIS — Z86.16 HISTORY OF COVID-19: ICD-10-CM

## 2021-02-17 DIAGNOSIS — R53.1 GENERALIZED WEAKNESS: Primary | ICD-10-CM

## 2021-02-17 DIAGNOSIS — Z74.09 DECREASED STRENGTH, ENDURANCE, AND MOBILITY: ICD-10-CM

## 2021-02-17 DIAGNOSIS — R68.89 DECREASED STRENGTH, ENDURANCE, AND MOBILITY: ICD-10-CM

## 2021-02-17 PROCEDURE — 97162 PT EVAL MOD COMPLEX 30 MIN: CPT | Performed by: PHYSICAL THERAPIST

## 2021-02-17 PROCEDURE — 97110 THERAPEUTIC EXERCISES: CPT | Performed by: PHYSICAL THERAPIST

## 2021-02-17 NOTE — PROGRESS NOTES
Physical Therapy Initial Evaluation and Plan of Care      Patient: Zuleyka Maharaj   : 1955  Diagnosis/ICD-10 Code:  Generalized weakness [R53.1]  Referring practitioner: Daniel NGUYEN  Date of Initial Visit: 2021  Today's Date: 2021  Patient seen for 1 sessions    Next MD appt: 2021.  Recertification: 2021          Subjective Questionnaire: QuickDASH: 70.45%  LEFS: 35/80      Subjective Evaluation    History of Present Illness  Date of onset: 2020    Subjective comment: patient reports she was diagnosed with COVID-19 and ended uo hospitilized shortly thereafter. She rpeorts she spent 10 days on a ventilator and then 7 days in the hospital after coming off the vent. She reports she was dischraged home with homehealth for PT and OT. She rpeorts she fInished home health about 2 weeks ago. No assistive device pior to this illness.  Patient Occupation: [email or web address]   Precautions and Work Restrictions: Off work since diagnosisQuality of life: good    Pain  Current pain ratin  At best pain ratin  At worst pain ratin  Location: arms  Quality: dull ache, cramping, discomfort and sharp  Relieving factors: rest  Aggravating factors: lifting, movement, overhead activity, ambulation, squatting, outstretched reach, repetitive movement, prolonged positioning, standing and stairs  Progression: improved    Social Support  Lives in: one-story house (2 steps into the home)  Lives with: spouse    Hand dominance: right    Treatments  No previous or current treatments  Discharged from (in last 30 days): home health care  Patient Goals  Patient goals for therapy: increased strength, independence with ADLs/IADLs, return to work, return to sport/leisure activities and increased motion             Objective          Static Posture     Head  Forward.    Shoulders  Rounded.    Thoracic Spine  Hyperkyphosis.    Lumbar Spine   Flattened.     Observations   Left  "Shoulder   Positive for trophic changes.     Right Shoulder  Positive for trophic changes.     Additional Shoulder Observation Details  Significant crepitus in the B joints    Active Range of Motion     Additional Active Range of Motion Details  AROM flexion/abduction R ~75-80°, L ~90°    Passive Range of Motion   Left Shoulder   Normal passive range of motion    Right Shoulder   Normal passive range of motion    Strength/Myotome Testing     Left Shoulder     Planes of Motion   Flexion: 3   Abduction: 3     Right Shoulder     Planes of Motion   Flexion: 3-   Abduction: 3-     Left Elbow   Flexion: 4-  Extension: 4-    Right Elbow   Flexion: 4-  Extension: 3+    Left Hip   Planes of Motion   Flexion: 3+  Extension: 3+  Abduction: 3+  Adduction: 3+    Right Hip   Planes of Motion   Flexion: 3-  Extension: 3  Abduction: 3+  Adduction: 3+    Left Knee   Flexion: 4-  Extension: 4    Right Knee   Flexion: 4-  Extension: 4    Left Ankle/Foot   Dorsiflexion: 5    Right Ankle/Foot   Dorsiflexion: 5    Ambulation   Weight-Bearing Status   Weight-Bearing Status (Left): weight-bearing as tolerated   Weight-Bearing Status (Right): weight-bearing as tolerated    Assistive device used: single point cane    Ambulation: Level Surfaces   Ambulation with assistive device: independent    Ambulation: Stairs     Additional Stairs Ambulation Details  Not tested.    Observational Gait   Gait: within functional limits   Decreased walking speed, stride length, left step length and right step length.   Base of support: normal    Comments   After 150' ambulation back to examining area, 92% O2 saturation and  bpm    Functional Assessment     Comments  30\" sit to/from stand test: x6 reps, B UE A, good eccentric control.  TU\", 25\", with SC, B UE A, utilitized SC.  2 minute walk test: 260' with SC, no rest periods, SOA;  90% O2 saturation and 147 bpm post walk test.            Assessment & Plan     Assessment  Impairments: abnormal " gait, activity intolerance, impaired balance, impaired physical strength and lacks appropriate home exercise program  Assessment details: Patient is ~2 months post COVID-19. She has significant weakness, lack of overall endurance and loss of ROM due to the weakness. She also has SOA with exertion and dip in O2 saturation with exertion. Patient did well with all HEP exercises today. Patient was given written copies of HEP.  Prognosis: fair  Prognosis details: Barriers to Rehab: Include significant or possible arthritic/degenerative changes that have occurred within the joints/spine, The patient's obesity, The patient's generally deconditioned state.    Safety Issues: Fall risk    Functional Limitations: carrying objects, lifting, walking, pulling, pushing, uncomfortable because of pain, moving in bed, standing, stooping, reaching overhead and unable to perform repetitive tasks  Goals  Plan Goals: Short Term Goals:  1) Patient I with HEP and have additoins/changes by next recertification.    2) Patient able to ambulate 300 feet with SC and no SOA and no LOB and O2 saturation >= 94%.    3) Patient able to perform sit to/from stand with 1 UE A = WB B LE x7, with good eccentric control in 30 seconds.    4) TUG in <= 20 seconds with SC assistive device, good eccentric control and safely with no LOB.     5) 2 minute walk test covering a distance of 300 feet utilizing no assistive device with no SOA and no LOB, O2 saturation >= 94%.    6) B Shoulder flexion/abduction AROM >= 105° each.    7) B bicep/tricep >= 4/5.    8) B hips >= 4-/5 in all directions.    9) Patient to be utilizing pursed lip breathing to help keep O2 up when exercising and exertion.    Long Term Goals:  1) B UE >= 4+/5    2) B Shoulder flexion/abduction AROM >= 125° each.    3) B QS/HS >= 4+/5.    4) B hips >= 4/5 in all directions.    5) Patient able to ascend/descend 3 steps reciprocally with 1 hand rail assist, minimal SOA x10 reps.    6) Patient able  to ambulate with no assistive device non-antalgicaly >= 1200 feet.    7) TUG in <= 20 seconds with no assistive device, good eccentric control and safely with no LOB.     8) 6 minute walk test covering a distance of >= 1/4 mile utilizing no assistive device with minimal SOA.    9) Patient to be I with final HEP.      Plan  Therapy options: will be seen for skilled physical therapy services  Planned therapy interventions: ADL retraining, balance/weight-bearing training, body mechanics training, flexibility, functional ROM exercises, gait training, home exercise program, IADL retraining, motor coordination training, neuromuscular re-education, postural training, strengthening, stretching, therapeutic activities and transfer training  Duration in visits: 20  Treatment plan discussed with: patient  Plan details: UE/LE stretching, strengthening, endurance, core stab, posture, gait, balance/proprioception, endurance and safety.      Other therapeutic activities and/or exercises will be prescribed depending on the patients progress or lack there of.     Visit Diagnoses:    ICD-10-CM ICD-9-CM   1. Generalized weakness  R53.1 780.79   2. History of COVID-19  Z86.16 V12.09   3. Decreased strength, endurance, and mobility  R53.1 780.79    Z74.09 780.99    R68.89        Timed:  Manual Therapy:         mins  34609;  Therapeutic Exercise:    15     mins  85839;      Neuromuscular Melissa:        mins  00740;    Therapeutic Activity:          mins  15187;     Gait Training:           mins  62620;     Ultrasound:          mins  46087;    Paraffin:        mins  07233;  Electrical Stimulation:         mins  50545 ( );    Untimed:  Electrical Stimulation:         mins  43577 ( );  Mechanical Traction:         mins  07118;     Timed Treatment:   15   mins   Total Treatment:     45   mins    PT SIGNATURE: Eveline Schafer PT DPT, CSCS   DATE TREATMENT INITIATED: 2/17/2021    Initial Certification  Certification Period:  5/18/2021  I certify that the therapy services are furnished while this patient is under my care.  The services outlined above are required by this patient, and will be reviewed every 90 days.     PHYSICIAN: Daniel Penn MD      DATE:     Please sign and return via fax to  .. Thank you, [facility].

## 2021-02-19 ENCOUNTER — TREATMENT (OUTPATIENT)
Dept: PHYSICAL THERAPY | Facility: CLINIC | Age: 66
End: 2021-02-19

## 2021-02-19 DIAGNOSIS — Z74.09 DECREASED STRENGTH, ENDURANCE, AND MOBILITY: ICD-10-CM

## 2021-02-19 DIAGNOSIS — R53.1 GENERALIZED WEAKNESS: Primary | ICD-10-CM

## 2021-02-19 DIAGNOSIS — Z86.16 HISTORY OF COVID-19: ICD-10-CM

## 2021-02-19 DIAGNOSIS — R53.1 DECREASED STRENGTH, ENDURANCE, AND MOBILITY: ICD-10-CM

## 2021-02-19 DIAGNOSIS — R68.89 DECREASED STRENGTH, ENDURANCE, AND MOBILITY: ICD-10-CM

## 2021-02-19 PROCEDURE — 97110 THERAPEUTIC EXERCISES: CPT | Performed by: PHYSICAL THERAPIST

## 2021-02-19 NOTE — PROGRESS NOTES
Physical Therapy Daily Progress Note    Patient: Zuleyka Maharaj   : 1955  Diagnosis/ICD-10 Code:     Diagnosis Plan   1. Generalized weakness     2. History of COVID-19     3. Decreased strength, endurance, and mobility       Referring practitioner: Daniel DAHL*  Date of Initial Visit: Type: THERAPY  Noted: 2021  Today's Date: 2021  Patient seen for 2 sessions      PT Recheck Due: 2021  PT MD Visit: 2021       Zuleyka Maharaj      Subjective       Objective   See Exercise, Manual, and Modality Logs for complete treatment.       Assessment & Plan     Assessment  Assessment details: Patient fatigues with toe taps. Gives good effort throughout. Patient is issued written HEP progression including those noted in exercise flow sheet. Patient verbalizes understanding.     Goals  Plan Goals: Short Term Goals:  1) Patient I with HEP and have additoins/changes by next recertification. (met)    2) Patient able to ambulate 300 feet with SC and no SOA and no LOB and O2 saturation >= 94%.    3) Patient able to perform sit to/from stand with 1 UE A = WB B LE x7, with good eccentric control in 30 seconds.    4) TUG in <= 20 seconds with SC assistive device, good eccentric control and safely with no LOB.     5) 2 minute walk test covering a distance of 300 feet utilizing no assistive device with no SOA and no LOB, O2 saturation >= 94%.    6) B Shoulder flexion/abduction AROM >= 105° each.    7) B bicep/tricep >= 4/5.    8) B hips >= 4-/5 in all directions.    9) Patient to be utilizing pursed lip breathing to help keep O2 up when exercising and exertion.    Long Term Goals:  1) B UE >= 4+/5    2) B Shoulder flexion/abduction AROM >= 125° each.    3) B QS/HS >= 4+/5.    4) B hips >= 4/5 in all directions.    5) Patient able to ascend/descend 3 steps reciprocally with 1 hand rail assist, minimal SOA x10 reps.    6) Patient able to ambulate with no assistive device non-antalgicaly >= 1200  feet.    7) TUG in <= 20 seconds with no assistive device, good eccentric control and safely with no LOB.     8) 6 minute walk test covering a distance of >= 1/4 mile utilizing no assistive device with minimal SOA.    9) Patient to be I with final HEP.    Plan  Plan details: Next visit sit to/from stand, may attempt standing hip ABD and Hip Flexion      Progress per Plan of Care and Progress strengthening /stabilization /functional activity            Timed:  Manual Therapy:         mins  17056;  Therapeutic Exercise:    45     mins  81315;   Aquatic Therex :        mins  79379    Neuromuscular Melissa:        mins  84897;    Therapeutic Activity:          mins  40410;     Gait Training:           mins  71043;     Ultrasound:          mins  44717;    Electrical Stimulation:         mins  60668 ( );    Untimed:  Electrical Stimulation:         mins  07521 ( );  Mechanical Traction:         mins  26674;   Paraffin:         mins  85271;  Orthotic fit/train:         mins  72288  Iontophoresis:          mins  31035    Timed Treatment:   45   mins   Total Treatment:     45   mins  Dona Coffey PTA  Physical Therapist Assistant

## 2021-02-22 ENCOUNTER — TREATMENT (OUTPATIENT)
Dept: PHYSICAL THERAPY | Facility: CLINIC | Age: 66
End: 2021-02-22

## 2021-02-22 DIAGNOSIS — R53.1 GENERALIZED WEAKNESS: Primary | ICD-10-CM

## 2021-02-22 DIAGNOSIS — R53.1 DECREASED STRENGTH, ENDURANCE, AND MOBILITY: ICD-10-CM

## 2021-02-22 DIAGNOSIS — Z74.09 DECREASED STRENGTH, ENDURANCE, AND MOBILITY: ICD-10-CM

## 2021-02-22 DIAGNOSIS — R68.89 DECREASED STRENGTH, ENDURANCE, AND MOBILITY: ICD-10-CM

## 2021-02-22 DIAGNOSIS — Z86.16 HISTORY OF COVID-19: ICD-10-CM

## 2021-02-22 PROCEDURE — 97110 THERAPEUTIC EXERCISES: CPT | Performed by: PHYSICAL THERAPIST

## 2021-02-22 NOTE — PROGRESS NOTES
Physical Therapy Daily Treatment Note      Patient: Zuleyka Maharaj   : 1955  Referring practitioner: Daniel DAHL*  Date of Initial Visit: Type: THERAPY  Noted: 2021  Today's Date: 2021  Patient seen for 3 sessions    Recert due:  3-11-21  MD followup:  3-11-21     Zuleyka Maharaj reports:   Subjective Questionnaire:       Subjective  Pt reports getting a little stronger.  Pre RX pain 0/10    Objective  Amb with SPC    See Exercise, Manual, and Modality Logs for complete treatment.       Assessment & Plan     Assessment  Assessment details: Pt tolerated therex well.  Requires very brief recovery time between sets.  Demos independence with T Band HEP.     Goals  Plan Goals: Plan Goals: Short Term Goals:  1) Patient I with HEP and have additoins/changes by next recertification. (met)    2) Patient able to ambulate 300 feet with SC and no SOA and no LOB and O2 saturation >= 94%.    3) Patient able to perform sit to/from stand with 1 UE A = WB B LE x7, with good eccentric control in 30 seconds.    4) TUG in <= 20 seconds with SC assistive device, good eccentric control and safely with no LOB.     5) 2 minute walk test covering a distance of 300 feet utilizing no assistive device with no SOA and no LOB, O2 saturation >= 94%.    6) B Shoulder flexion/abduction AROM >= 105° each.    7) B bicep/tricep >= 4/5.    8) B hips >= 4-/5 in all directions.    9) Patient to be utilizing pursed lip breathing to help keep O2 up when exercising and exertion.    Long Term Goals:  1) B UE >= 4+/5    2) B Shoulder flexion/abduction AROM >= 125° each.    3) B QS/HS >= 4+/5.    4) B hips >= 4/5 in all directions.    5) Patient able to ascend/descend 3 steps reciprocally with 1 hand rail assist, minimal SOA x10 reps.    6) Patient able to ambulate with no assistive device non-antalgicaly >= 1200 feet.    7) TUG in <= 20 seconds with no assistive device, good eccentric control and safely with no LOB.     8)  6 minute walk test covering a distance of >= 1/4 mile utilizing no assistive device with minimal SOA.    9) Patient to be I with final HEP.    Plan  Duration in visits: 20  Plan details: 300 ft walk checking O2 sat to assess goal.         Visit Diagnoses:    ICD-10-CM ICD-9-CM   1. Generalized weakness  R53.1 780.79   2. History of COVID-19  Z86.16 V12.09   3. Decreased strength, endurance, and mobility  R53.1 780.79    Z74.09 780.99    R68.89                   Timed:  Manual Therapy:         mins  55919;  Therapeutic Exercise:  47       mins  03229;     Neuromuscular Melissa:        mins  73588;    Therapeutic Activity:          mins  84373;     Gait Training:           mins  35186;     Ultrasound:          mins  99767;    Electrical Stimulation:         mins  75853 ( );    Untimed:  Electrical Stimulation:         mins  05526 ( );  Mechanical Traction:         mins  55858;     Timed Treatment:  47    mins   Total Treatment:     47   mins  Lashon Munson PTA  Physical Therapist Assistant

## 2021-02-24 ENCOUNTER — TREATMENT (OUTPATIENT)
Dept: PHYSICAL THERAPY | Facility: CLINIC | Age: 66
End: 2021-02-24

## 2021-02-24 DIAGNOSIS — R68.89 DECREASED STRENGTH, ENDURANCE, AND MOBILITY: ICD-10-CM

## 2021-02-24 DIAGNOSIS — Z86.16 HISTORY OF COVID-19: ICD-10-CM

## 2021-02-24 DIAGNOSIS — R53.1 GENERALIZED WEAKNESS: Primary | ICD-10-CM

## 2021-02-24 DIAGNOSIS — Z74.09 DECREASED STRENGTH, ENDURANCE, AND MOBILITY: ICD-10-CM

## 2021-02-24 DIAGNOSIS — R53.1 DECREASED STRENGTH, ENDURANCE, AND MOBILITY: ICD-10-CM

## 2021-02-24 PROCEDURE — 97110 THERAPEUTIC EXERCISES: CPT | Performed by: PHYSICAL THERAPIST

## 2021-02-24 NOTE — PROGRESS NOTES
Physical Therapy Daily Progress Note    Patient: Zuleyka Maharaj   : 1955  Diagnosis/ICD-10 Code:     Diagnosis Plan   1. Generalized weakness     2. History of COVID-19     3. Decreased strength, endurance, and mobility       Referring practitioner: Daniel DAHL*  Date of Initial Visit: Type: THERAPY  Noted: 2021  Today's Date: 2021  Patient seen for 4 sessions      Recert due:  3-  MD followup:  3-       Zuleyka Maharaj        Subjective Evaluation    History of Present Illness    Subjective comment: states that she is doign well. tired. Pain  Current pain ratin             Objective   See Exercise, Manual, and Modality Logs for complete treatment.       Assessment & Plan     Assessment  Assessment details: Patient did well today. Good effort throughout. Patient is fatigued with repetitive and timed exericses but is able to complete.     Goals  Plan Goals: Short Term Goals:  1) Patient I with HEP and have additoins/changes by next recertification. (met)    2) Patient able to ambulate 300 feet with SC and no SOA and no LOB and O2 saturation >= 94%.    3) Patient able to perform sit to/from stand with 1 UE A = WB B LE x7, with good eccentric control in 30 seconds.    4) TUG in <= 20 seconds with SC assistive device, good eccentric control and safely with no LOB.     5) 2 minute walk test covering a distance of 300 feet utilizing no assistive device with no SOA and no LOB, O2 saturation >= 94%.    6) B Shoulder flexion/abduction AROM >= 105° each.    7) B bicep/tricep >= 4/5.    8) B hips >= 4-/5 in all directions.    9) Patient to be utilizing pursed lip breathing to help keep O2 up when exercising and exertion.    Long Term Goals:  1) B UE >= 4+/5    2) B Shoulder flexion/abduction AROM >= 125° each.    3) B QS/HS >= 4+/5.    4) B hips >= 4/5 in all directions.    5) Patient able to ascend/descend 3 steps reciprocally with 1 hand rail assist, minimal SOA x10  reps.    6) Patient able to ambulate with no assistive device non-antalgicaly >= 1200 feet.    7) TUG in <= 20 seconds with no assistive device, good eccentric control and safely with no LOB.     8) 6 minute walk test covering a distance of >= 1/4 mile utilizing no assistive device with minimal SOA.    9) Patient to be I with final HEP.    Plan  Plan details: Assess gait 300 feet with O2 SATS       Progress per Plan of Care and Progress strengthening /stabilization /functional activity            Timed:  Manual Therapy:         mins  05769;  Therapeutic Exercise:    43     mins  54989;   Aquatic Therex :        mins  76334    Neuromuscular Melissa:        mins  77730;    Therapeutic Activity:          mins  07534;     Gait Training:           mins  36954;     Ultrasound:          mins  81483;    Electrical Stimulation:         mins  48772 ( );    Untimed:  Electrical Stimulation:         mins  02871 ( );  Mechanical Traction:         mins  52584;   Paraffin:         mins  49128;  Orthotic fit/train:         mins  49219  Iontophoresis:          mins  50531    Timed Treatment:   43   mins   Total Treatment:     43   mins  Dona Coffey PTA  Physical Therapist Assistant

## 2021-03-01 ENCOUNTER — TREATMENT (OUTPATIENT)
Dept: PHYSICAL THERAPY | Facility: CLINIC | Age: 66
End: 2021-03-01

## 2021-03-01 DIAGNOSIS — Z86.16 HISTORY OF COVID-19: ICD-10-CM

## 2021-03-01 DIAGNOSIS — R68.89 DECREASED STRENGTH, ENDURANCE, AND MOBILITY: ICD-10-CM

## 2021-03-01 DIAGNOSIS — Z74.09 DECREASED STRENGTH, ENDURANCE, AND MOBILITY: ICD-10-CM

## 2021-03-01 DIAGNOSIS — R53.1 DECREASED STRENGTH, ENDURANCE, AND MOBILITY: ICD-10-CM

## 2021-03-01 DIAGNOSIS — R53.1 GENERALIZED WEAKNESS: Primary | ICD-10-CM

## 2021-03-01 PROCEDURE — 97110 THERAPEUTIC EXERCISES: CPT | Performed by: PHYSICAL THERAPIST

## 2021-03-01 NOTE — PROGRESS NOTES
Physical Therapy Daily Progress Note    Patient: Zuleyka Maharaj   : 1955  Diagnosis/ICD-10 Code:     Diagnosis Plan   1. Generalized weakness     2. History of COVID-19     3. Decreased strength, endurance, and mobility       Referring practitioner: Daniel NGUYEN  Date of Initial Visit: Type: THERAPY  Noted: 2021  Today's Date: 3/1/2021  Patient seen for 5 sessions      PT Recheck Due: 2021  PT MD Visit: 2021       Zuleyka Maharaj         Subjective Evaluation    History of Present Illness    Subjective comment: patient reports right knee pain today secondary to arthritic changes in the joint. Pain  Current pain ratin             Objective   See Exercise, Manual, and Modality Logs for complete treatment.       Assessment & Plan     Assessment  Assessment details: Treatment is modified today to keep patient seated or supine secondary to knee complaints of arthritis. Gives good effort. No complaints throughout.     Goals  Plan Goals: Short Term Goals:  1) Patient I with HEP and have additoins/changes by next recertification. (met)    2) Patient able to ambulate 300 feet with SC and no SOA and no LOB and O2 saturation >= 94%.     3) Patient able to perform sit to/from stand with 1 UE A = WB B LE x7, with good eccentric control in 30 seconds.    4) TUG in <= 20 seconds with SC assistive device, good eccentric control and safely with no LOB.     5) 2 minute walk test covering a distance of 300 feet utilizing no assistive device with no SOA and no LOB, O2 saturation >= 94%.    6) B Shoulder flexion/abduction AROM >= 105° each.    7) B bicep/tricep >= 4/5.    8) B hips >= 4-/5 in all directions.    9) Patient to be utilizing pursed lip breathing to help keep O2 up when exercising and exertion.    Long Term Goals:  1) B UE >= 4+/5    2) B Shoulder flexion/abduction AROM >= 125° each.    3) B QS/HS >= 4+/5.    4) B hips >= 4/5 in all directions.    5) Patient able to ascend/descend  3 steps reciprocally with 1 hand rail assist, minimal SOA x10 reps.    6) Patient able to ambulate with no assistive device non-antalgicaly >= 1200 feet.    7) TUG in <= 20 seconds with no assistive device, good eccentric control and safely with no LOB.     8) 6 minute walk test covering a distance of >= 1/4 mile utilizing no assistive device with minimal SOA.    9) Patient to be I with final HEP.    Plan  Plan details: Resume standing therex as patient tolerates. Assess gait goal      Progress per Plan of Care and Progress strengthening /stabilization /functional activity            Timed:  Manual Therapy:         mins  92344;  Therapeutic Exercise:    45    mins  58241;   Aquatic Therex :        mins  81140    Neuromuscular Melissa:        mins  33734;    Therapeutic Activity:          mins  48204;     Gait Training:           mins  57301;     Ultrasound:          mins  60293;    Electrical Stimulation:         mins  63225 ( );    Untimed:  Electrical Stimulation:         mins  06919 ( );  Mechanical Traction:         mins  62738;   Paraffin:         mins  48909;  Orthotic fit/train:         mins  91532  Iontophoresis:          mins  33630    Timed Treatment:   45   mins   Total Treatment:     45   mins  Dona Coffey PTA  Physical Therapist Assistant

## 2021-03-03 ENCOUNTER — TREATMENT (OUTPATIENT)
Dept: PHYSICAL THERAPY | Facility: CLINIC | Age: 66
End: 2021-03-03

## 2021-03-03 DIAGNOSIS — R53.1 GENERALIZED WEAKNESS: Primary | ICD-10-CM

## 2021-03-03 DIAGNOSIS — R53.1 DECREASED STRENGTH, ENDURANCE, AND MOBILITY: ICD-10-CM

## 2021-03-03 DIAGNOSIS — Z74.09 DECREASED STRENGTH, ENDURANCE, AND MOBILITY: ICD-10-CM

## 2021-03-03 DIAGNOSIS — R68.89 DECREASED STRENGTH, ENDURANCE, AND MOBILITY: ICD-10-CM

## 2021-03-03 DIAGNOSIS — Z86.16 HISTORY OF COVID-19: ICD-10-CM

## 2021-03-03 PROCEDURE — 97110 THERAPEUTIC EXERCISES: CPT | Performed by: PHYSICAL THERAPIST

## 2021-03-03 NOTE — PROGRESS NOTES
Physical Therapy Daily Progress Note    Patient: Zuleyka Maharaj   : 1955  Diagnosis/ICD-10 Code:     Diagnosis Plan   1. Generalized weakness     2. History of COVID-19     3. Decreased strength, endurance, and mobility       Referring practitioner: Daniel DAHL*  Date of Initial Visit: Type: THERAPY  Noted: 2021  Today's Date: 3/3/2021  Patient seen for 6 sessions      PT Recheck Due: 2021  PT MD Visit: 2021       Zuleyka Maharaj reports: 60% improvement        Subjective Evaluation    History of Present Illness    Subjective comment: denies pain. states that she is wearing hte brace on her knee thta she always wore to work. Pain  Current pain ratin             Objective   See Exercise, Manual, and Modality Logs for complete treatment.       Assessment & Plan     Assessment  Assessment details: With 300 feet of gait patient's O2 SATS drop down to 92% and takes approximately 1 minute with cues for pursed lip breathing to recover to 95%. Has no loss of balance with gait. Good eccentric control with sit to stand and equal weightbearing with and without UE assist today.     Goals  Plan Goals: Short Term Goals:  1) Patient I with HEP and have additoins/changes by next recertification. (met)    2) Patient able to ambulate 300 feet with SC and no SOA and no LOB and O2 saturation >= 94%.  (progressing)    3) Patient able to perform sit to/from stand with 1 UE A = WB B LE x7, with good eccentric control in 30 seconds. (met)    4) TUG in <= 20 seconds with SC assistive device, good eccentric control and safely with no LOB.     5) 2 minute walk test covering a distance of 300 feet utilizing no assistive device with no SOA and no LOB, O2 saturation >= 94%.    6) B Shoulder flexion/abduction AROM >= 105° each.    7) B bicep/tricep >= 4/5.    8) B hips >= 4-/5 in all directions.    9) Patient to be utilizing pursed lip breathing to help keep O2 up when exercising and exertion.  (progressing)    Long Term Goals:  1) B UE >= 4+/5    2) B Shoulder flexion/abduction AROM >= 125° each.    3) B QS/HS >= 4+/5.    4) B hips >= 4/5 in all directions.    5) Patient able to ascend/descend 3 steps reciprocally with 1 hand rail assist, minimal SOA x10 reps.    6) Patient able to ambulate with no assistive device non-antalgicaly >= 1200 feet.    7) TUG in <= 20 seconds with no assistive device, good eccentric control and safely with no LOB.     8) 6 minute walk test covering a distance of >= 1/4 mile utilizing no assistive device with minimal SOA.    9) Patient to be I with final HEP.    Plan  Plan details: Shuttle next visit      Progress per Plan of Care and Progress strengthening /stabilization /functional activity            Timed:  Manual Therapy:         mins  45281;  Therapeutic Exercise:    45     mins  79738;   Aquatic Therex :        mins  19631    Neuromuscular Melissa:        mins  73401;    Therapeutic Activity:          mins  12578;     Gait Training:           mins  73234;     Ultrasound:          mins  56336;    Electrical Stimulation:         mins  09546 ( );    Untimed:  Electrical Stimulation:         mins  67057 ( );  Mechanical Traction:         mins  77384;   Paraffin:         mins  86717;  Orthotic fit/train:         mins  47035  Iontophoresis:          mins  12820    Timed Treatment:   45   mins   Total Treatment:     45   mins  Dona Coffey PTA  Physical Therapist Assistant

## 2021-03-08 ENCOUNTER — TREATMENT (OUTPATIENT)
Dept: PHYSICAL THERAPY | Facility: CLINIC | Age: 66
End: 2021-03-08

## 2021-03-08 DIAGNOSIS — Z86.16 HISTORY OF COVID-19: ICD-10-CM

## 2021-03-08 DIAGNOSIS — R53.1 GENERALIZED WEAKNESS: Primary | ICD-10-CM

## 2021-03-08 DIAGNOSIS — R53.1 DECREASED STRENGTH, ENDURANCE, AND MOBILITY: ICD-10-CM

## 2021-03-08 DIAGNOSIS — R68.89 DECREASED STRENGTH, ENDURANCE, AND MOBILITY: ICD-10-CM

## 2021-03-08 DIAGNOSIS — Z74.09 DECREASED STRENGTH, ENDURANCE, AND MOBILITY: ICD-10-CM

## 2021-03-08 PROCEDURE — 97530 THERAPEUTIC ACTIVITIES: CPT | Performed by: PHYSICAL THERAPIST

## 2021-03-08 PROCEDURE — 97110 THERAPEUTIC EXERCISES: CPT | Performed by: PHYSICAL THERAPIST

## 2021-03-08 NOTE — PROGRESS NOTES
Physical Therapy Daily Treatment Note      Patient: Zuleyka Maharaj   : 1955  Referring practitioner: Daniel DAHL*  Date of Initial Visit: Type: THERAPY  Noted: 2021  Today's Date: 3/8/2021  Patient seen for 7 sessions    Recert due:  3-11-21  MD followup:  3-11-21     Zuleyka Maharaj reports:  60% improvement  Subjective Questionnaire:       Subjective   Reports getting stronger.  R knee is hurting today.      Objective  Amb with SPC on R.  Suggested cane on L due to R knee pain.  O2 sat 95% post PRO II and 93% post 300 ft gait.     See Exercise, Manual, and Modality Logs for complete treatment.       Assessment & Plan     Assessment  Assessment details: Pt did well with therex despite c/o R knee pain.  Min drop in O2 sat with exertion.  Shoulders fatigue very easily.      Goals  Plan Goals: Plan Goals: Short Term Goals:  1) Patient I with HEP and have additoins/changes by next recertification. (met)    2) Patient able to ambulate 300 feet with SC and no SOA and no LOB and O2 saturation >= 94%.  (progressing)    3) Patient able to perform sit to/from stand with 1 UE A = WB B LE x7, with good eccentric control in 30 seconds. (met)    4) TUG in <= 20 seconds with SC assistive device, good eccentric control and safely with no LOB.     5) 2 minute walk test covering a distance of 300 feet utilizing no assistive device with no SOA and no LOB, O2 saturation >= 94%.    6) B Shoulder flexion/abduction AROM >= 105° each.    7) B bicep/tricep >= 4/5.    8) B hips >= 4-/5 in all directions.    9) Patient to be utilizing pursed lip breathing to help keep O2 up when exercising and exertion. (progressing)    Long Term Goals:  1) B UE >= 4+/5    2) B Shoulder flexion/abduction AROM >= 125° each.    3) B QS/HS >= 4+/5.    4) B hips >= 4/5 in all directions.    5) Patient able to ascend/descend 3 steps reciprocally with 1 hand rail assist, minimal SOA x10 reps.    6) Patient able to ambulate with no  assistive device non-antalgicaly >= 1200 feet.    7) TUG in <= 20 seconds with no assistive device, good eccentric control and safely with no LOB.     8) 6 minute walk test covering a distance of >= 1/4 mile utilizing no assistive device with minimal SOA.    9) Patient to be I with final HEP.    Plan  Plan details: T Band rows, ext        Visit Diagnoses:    ICD-10-CM ICD-9-CM   1. Generalized weakness  R53.1 780.79   2. History of COVID-19  Z86.16 V12.09   3. Decreased strength, endurance, and mobility  R53.1 780.79    Z74.09 780.99    R68.89                   Timed:  Manual Therapy:         mins  99976;  Therapeutic Exercise:   35      mins  62151;     Neuromuscular Melissa:        mins  39955;    Therapeutic Activity:    10     mins  83662;     Gait Training:           mins  38594;     Ultrasound:          mins  40403;    Electrical Stimulation:         mins  19978 ( );    Untimed:  Electrical Stimulation:         mins  33574 ( );  Mechanical Traction:         mins  18318;     Timed Treatment: 45    mins  Total Treatment:   45   mins  Lashon Munson PTA  Physical Therapist Assistant

## 2021-03-10 ENCOUNTER — TREATMENT (OUTPATIENT)
Dept: PHYSICAL THERAPY | Facility: CLINIC | Age: 66
End: 2021-03-10

## 2021-03-10 DIAGNOSIS — R68.89 DECREASED STRENGTH, ENDURANCE, AND MOBILITY: ICD-10-CM

## 2021-03-10 DIAGNOSIS — R53.1 DECREASED STRENGTH, ENDURANCE, AND MOBILITY: ICD-10-CM

## 2021-03-10 DIAGNOSIS — Z74.09 DECREASED STRENGTH, ENDURANCE, AND MOBILITY: ICD-10-CM

## 2021-03-10 DIAGNOSIS — R53.1 GENERALIZED WEAKNESS: Primary | ICD-10-CM

## 2021-03-10 DIAGNOSIS — Z86.16 HISTORY OF COVID-19: ICD-10-CM

## 2021-03-10 PROCEDURE — 97530 THERAPEUTIC ACTIVITIES: CPT | Performed by: PHYSICAL THERAPIST

## 2021-03-10 PROCEDURE — 97110 THERAPEUTIC EXERCISES: CPT | Performed by: PHYSICAL THERAPIST

## 2021-03-10 NOTE — PROGRESS NOTES
Re-Assessment / Re-Certification      Patient: Zuleyka Maharaj   : 1955  Diagnosis/ICD-10 Code:  Generalized weakness [R53.1]  Referring practitioner: Daniel DAHL*  Date of Initial Visit: Type: THERAPY  Noted: 2021  Today's Date: 3/10/2021  Patient seen for 8 sessions    Next MD appt: 2021.  Recertification: 2021     Subjective:   Zuleyka Maharaj reports: 50-60% improvement.  Subjective Questionnaire: QuickDASH: 33.33%  LEFS: 34/80  Clinical Progress: improved  Home Program Compliance: Yes  Treatment has included: therapeutic exercise, neuromuscular re-education, therapeutic activity and gait training    Subjective Evaluation    Pain  Current pain ratin         Objective          Static Posture     Head  Forward.    Shoulders  Rounded.    Thoracic Spine  Hyperkyphosis.    Lumbar Spine   Flattened.     Observations   Left Shoulder   Positive for trophic changes.     Right Shoulder  Positive for trophic changes.     Additional Shoulder Observation Details  Significant crepitus in the B joints    Active Range of Motion   Left Shoulder   Flexion: 114 degrees   Abduction: 85 degrees     Right Shoulder   Flexion: 108 degrees   Abduction: 80 degrees     Passive Range of Motion   Left Shoulder   Normal passive range of motion    Right Shoulder   Normal passive range of motion    Strength/Myotome Testing     Left Shoulder     Planes of Motion   Flexion: 3   Abduction: 3-     Right Shoulder     Planes of Motion   Flexion: 3-   Abduction: 3-     Left Elbow   Flexion: 4-  Extension: 4-    Right Elbow   Flexion: 4-  Extension: 3+    Left Hip   Planes of Motion   Flexion: 4-  Extension: 4-  Abduction: 4-  Adduction: 4-    Right Hip   Planes of Motion   Flexion: 3  Extension: 3+  Abduction: 3+  Adduction: 3+    Left Knee   Flexion: 4  Extension: 4    Right Knee   Flexion: 4  Extension: 4    Left Ankle/Foot   Dorsiflexion: 5    Right Ankle/Foot   Dorsiflexion: 5    Ambulation  "  Weight-Bearing Status   Weight-Bearing Status (Left): weight-bearing as tolerated   Weight-Bearing Status (Right): weight-bearing as tolerated    Assistive device used: single point cane and none    Additional Weight-Bearing Status Details  No assistive device within the clinic.    Ambulation: Level Surfaces   Ambulation with assistive device: independent  Ambulation without assistive device: independent    Ambulation: Stairs     Additional Stairs Ambulation Details  Not tested.    Observational Gait   Gait: within functional limits   Left step length and right step length within functional limits. Decreased walking speed and stride length.   Base of support: normal    Functional Assessment     Comments  30\" sit to/from stand test: x8.5 reps, no UE A, good eccentric control.  TU\", 19\", with SC, B UE A,  no UE A sit to stand, good eccentric control.  TUG: no assistive device: 15\", 15\",  no UE A sit to stand, good eccentric control.  2 minute walk test: 330' carrying SC, no rest periods, SOA;  95% O2 saturation post walk test.        Assessment & Plan     Assessment  Impairments: abnormal gait, activity intolerance, impaired balance, impaired physical strength and lacks appropriate home exercise program  Assessment details: Patient is progressing well with endurance and gait. Still lacking overall strength in UE/LEs. Patient did well with new there ex today.   Prognosis: fair  Prognosis details: Barriers to Rehab: Include significant or possible arthritic/degenerative changes that have occurred within the joints/spine, The patient's obesity, The patient's generally deconditioned state.    Safety Issues: Fall risk    Functional Limitations: carrying objects, lifting, walking, pulling, pushing, uncomfortable because of pain, moving in bed, standing, stooping, reaching overhead and unable to perform repetitive tasks  Goals  Plan Goals: Plan Goals: Short Term Goals:  1) Patient I with HEP and have additoins/changes " by next recertification. (met)    2) Patient able to ambulate 300 feet with SC and no SOA and no LOB and O2 saturation >= 94%.  (met)    3) Patient able to perform sit to/from stand with 1 UE A = WB B LE x7, with good eccentric control in 30 seconds. (met)    4) TUG in <= 20 seconds with SC assistive device, good eccentric control and safely with no LOB.  (met)    5) 2 minute walk test covering a distance of 300 feet utilizing no assistive device with no SOA and no LOB, O2 saturation >= 94%. (met)    6) B Shoulder flexion/abduction AROM >= 105° each. (partially met, met for flexion)    7) B bicep/tricep >= 4/5.    8) B hips >= 4-/5 in all directions. (partially met)    9) Patient to be utilizing pursed lip breathing to help keep O2 up when exercising and exertion. (partially met/progressing)    Long Term Goals:  1) B UE >= 4+/5    2) B Shoulder flexion/abduction AROM >= 125° each.    3) B QS/HS >= 4+/5.    4) B hips >= 4/5 in all directions.    5) Patient able to ascend/descend 3 steps reciprocally with 1 hand rail assist, minimal SOA x10 reps.    6) Patient able to ambulate with no assistive device non-antalgicaly >= 1200 feet.    7) TUG in <= 20 seconds with no assistive device, good eccentric control and safely with no LOB.     8) 6 minute walk test covering a distance of >= 1/4 mile utilizing no assistive device with minimal SOA.    9) Patient to be I with final HEP.    Plan  Therapy options: will be seen for skilled physical therapy services  Planned therapy interventions: ADL retraining, balance/weight-bearing training, body mechanics training, flexibility, functional ROM exercises, gait training, home exercise program, IADL retraining, motor coordination training, neuromuscular re-education, postural training, strengthening, stretching, therapeutic activities and transfer training  Duration in visits: 20  Treatment plan discussed with: patient  Plan details: T Band rows, ext next session. Resume step ups.  Continue to progress strength and endurance. Possibly susan wall circles/wipes eventually.      Other therapeutic activities and/or exercises will be prescribed depending on the patients progress or lack there of.     Visit Diagnoses:    ICD-10-CM ICD-9-CM   1. Generalized weakness  R53.1 780.79   2. History of COVID-19  Z86.16 V12.09   3. Decreased strength, endurance, and mobility  R53.1 780.79    Z74.09 780.99    R68.89        Progress toward previous goals: Partially Met        Recommendations: Continue as planned  Timeframe: 6 weeks  Prognosis to achieve goals: fair    PT Signature: Eveline Schafer, PT DPT, CSCS      Based upon review of the patient's progress and continued therapy plan, it is my medical opinion that Zuleyka Maharaj should continue physical therapy treatment at [facility] PHYSICAL THERAPY  [facility] [address] [place] [address] [phone].    Signature: __________________________________  Daniel Penn MD    Timed:  Manual Therapy:         mins  07719;  Therapeutic Exercise:    25     mins  01248;     Neuromuscular Melissa:        mins  53062;    Therapeutic Activity:     25     mins  65540;     Gait Training:           mins  60370;     Ultrasound:          mins  57992;    Paraffin:        mins  87262;  Electrical Stimulation:         mins  25473 ( );    Untimed:  Electrical Stimulation:         mins  05339 ( );  Mechanical Traction:         mins  04346;     Timed Treatment:   50   mins   Total Treatment:     50   mins

## 2021-03-15 ENCOUNTER — TREATMENT (OUTPATIENT)
Dept: PHYSICAL THERAPY | Facility: CLINIC | Age: 66
End: 2021-03-15

## 2021-03-15 DIAGNOSIS — Z74.09 DECREASED STRENGTH, ENDURANCE, AND MOBILITY: ICD-10-CM

## 2021-03-15 DIAGNOSIS — R53.1 DECREASED STRENGTH, ENDURANCE, AND MOBILITY: ICD-10-CM

## 2021-03-15 DIAGNOSIS — Z86.16 HISTORY OF COVID-19: ICD-10-CM

## 2021-03-15 DIAGNOSIS — R68.89 DECREASED STRENGTH, ENDURANCE, AND MOBILITY: ICD-10-CM

## 2021-03-15 DIAGNOSIS — R53.1 GENERALIZED WEAKNESS: Primary | ICD-10-CM

## 2021-03-15 PROCEDURE — 97112 NEUROMUSCULAR REEDUCATION: CPT | Performed by: PHYSICAL THERAPIST

## 2021-03-15 PROCEDURE — 97110 THERAPEUTIC EXERCISES: CPT | Performed by: PHYSICAL THERAPIST

## 2021-03-15 NOTE — PROGRESS NOTES
Physical Therapy Daily Treatment Note      Patient: Zuleyka Maharaj   : 1955  Referring practitioner: Daniel DAHL*  Date of Initial Visit: Type: THERAPY  Noted: 2021  Today's Date: 3/15/2021  Patient seen for 9 sessions    Recert due:  3-31-21  MD followup:   May     Zuleyka Maharaj reports: 50-60% improvement    Subjective Questionnaire:       Subjective  Pt reports that she  does want to return to work, but doesn't feel able just yet.      Objective  Amb with SPC    See Exercise, Manual, and Modality Logs for complete treatment.       Assessment & Plan     Assessment  Assessment details: Pt able tolerate longer treatment today.  O2 sat remained at 91% or above throughout treatment.  Does get  winded and require brief rest after strenuous exercises.  Conts to use SPC for ambulation.       Plan  Duration in visits: 20  Plan details: T Band rows, ext        Visit Diagnoses:    ICD-10-CM ICD-9-CM   1. Generalized weakness  R53.1 780.79   2. History of COVID-19  Z86.16 V12.09   3. Decreased strength, endurance, and mobility  R53.1 780.79    Z74.09 780.99    R68.89                   Timed:  Manual Therapy:         mins  67246;  Therapeutic Exercise:    40     mins  08087;     Neuromuscular Melissa:    15      mins  18555;     Gait Training:           mins  89782;     Ultrasound:          mins  51034;    Electrical Stimulation:         mins  29878 ( );    Untimed:  Electrical Stimulation:         mins  35888 ( );  Mechanical Traction:         mins  63641;     Timed Treatment       55    mins   Total Treatment:        55      mins  Lashon Munson PTA  Physical Therapist Assistant

## 2021-03-17 ENCOUNTER — TREATMENT (OUTPATIENT)
Dept: PHYSICAL THERAPY | Facility: CLINIC | Age: 66
End: 2021-03-17

## 2021-03-17 DIAGNOSIS — R68.89 DECREASED STRENGTH, ENDURANCE, AND MOBILITY: ICD-10-CM

## 2021-03-17 DIAGNOSIS — Z74.09 DECREASED STRENGTH, ENDURANCE, AND MOBILITY: ICD-10-CM

## 2021-03-17 DIAGNOSIS — R53.1 DECREASED STRENGTH, ENDURANCE, AND MOBILITY: ICD-10-CM

## 2021-03-17 DIAGNOSIS — R53.1 GENERALIZED WEAKNESS: Primary | ICD-10-CM

## 2021-03-17 DIAGNOSIS — Z86.16 HISTORY OF COVID-19: ICD-10-CM

## 2021-03-17 PROCEDURE — 97110 THERAPEUTIC EXERCISES: CPT | Performed by: PHYSICAL THERAPIST

## 2021-03-17 NOTE — PROGRESS NOTES
Physical Therapy Daily Progress Note      Patient: Zuleyka aMharaj   : 1955  Referring practitioner: Daniel DAHL*  Date of Initial Visit: Type: THERAPY  Noted: 2021  Today's Date: 3/17/2021  Patient seen for 10 sessions    Recert due:  3-31-21  MD followup:   May                Subjective     Zuleyka Maharaj relates is feeling better and better; improved energy, resumed prior sleep patterns, good appetite.  States continues to have difficulty fixing hair - prolonged positioning with arms elevated.  Denies fatigue after therapy.  States is performing HEP each day.     Objective   See Exercise, Manual, and Modality Logs for complete treatment.       Assessment & Plan     Assessment  Assessment details: Pt was able to walk around the clinic without her cane, with good balance. Related improved stamina. Progressed weight with bicep curls and performed step ups with cuff weights on ankles. Pt related good tolerance of increased challenge.   Prognosis: good    Goals  Plan Goals: Short Term Goals:  1) Patient I with HEP and have additoins/changes by next recertification. (met)    2) Patient able to ambulate 300 feet with SC and no SOA and no LOB and O2 saturation >= 94%.  (met)    3) Patient able to perform sit to/from stand with 1 UE A = WB B LE x7, with good eccentric control in 30 seconds. (met)    4) TUG in <= 20 seconds with SC assistive device, good eccentric control and safely with no LOB.  (met)    5) 2 minute walk test covering a distance of 300 feet utilizing no assistive device with no SOA and no LOB, O2 saturation >= 94%. (met)    6) B Shoulder flexion/abduction AROM >= 105° each. (partially met, met for flexion)    7) B bicep/tricep >= 4/5.    8) B hips >= 4-/5 in all directions. (partially met)    9) Patient to be utilizing pursed lip breathing to help keep O2 up when exercising and exertion. (partially met/progressing)    Long Term Goals:  1) B UE >= 4+/5    2) B Shoulder  flexion/abduction AROM >= 125° each.    3) B QS/HS >= 4+/5.    4) B hips >= 4/5 in all directions.    5) Patient able to ascend/descend 3 steps reciprocally with 1 hand rail assist, minimal SOA x10 reps.    6) Patient able to ambulate with no assistive device non-antalgicaly >= 1200 feet.    7) TUG in <= 20 seconds with no assistive device, good eccentric control and safely with no LOB.     8) 6 minute walk test covering a distance of >= 1/4 mile utilizing no assistive device with minimal SOA.    9) Patient to be I with final HEP.        Plan  Duration in visits: 20  Plan details: Will continue therapy as planned with progression as indicated.         Visit Diagnoses:    ICD-10-CM ICD-9-CM   1. Generalized weakness  R53.1 780.79   2. History of COVID-19  Z86.16 V12.09   3. Decreased strength, endurance, and mobility  R53.1 780.79    Z74.09 780.99    R68.89        Progress per Plan of Care and Progress strengthening /stabilization /functional activity           Timed:  Manual Therapy:         mins  85059;  Therapeutic Exercise:    45     mins  07682;     Neuromuscular Melissa:        mins  12076;    Therapeutic Activity:          mins  21090;     Gait Training:           mins  28857;     Ultrasound:          mins  92219;    Paraffin:        mins  26261;  Electrical Stimulation:         mins  02280 ( );    Untimed:  Electrical Stimulation:         mins  97623 ( );  Mechanical Traction:         mins  26054;     Timed Treatment:    45  mins   Total Treatment:  50      mins    Padmini Duran PT,   Physical Therapist          This document has been electronically signed by Padmini Duran PT on March 17, 2021 15:18 CDT

## 2021-03-22 ENCOUNTER — TREATMENT (OUTPATIENT)
Dept: PHYSICAL THERAPY | Facility: CLINIC | Age: 66
End: 2021-03-22

## 2021-03-22 DIAGNOSIS — Z74.09 DECREASED STRENGTH, ENDURANCE, AND MOBILITY: ICD-10-CM

## 2021-03-22 DIAGNOSIS — R53.1 GENERALIZED WEAKNESS: Primary | ICD-10-CM

## 2021-03-22 DIAGNOSIS — R68.89 DECREASED STRENGTH, ENDURANCE, AND MOBILITY: ICD-10-CM

## 2021-03-22 DIAGNOSIS — Z86.16 HISTORY OF COVID-19: ICD-10-CM

## 2021-03-22 DIAGNOSIS — R53.1 DECREASED STRENGTH, ENDURANCE, AND MOBILITY: ICD-10-CM

## 2021-03-22 PROCEDURE — 97110 THERAPEUTIC EXERCISES: CPT | Performed by: PHYSICAL THERAPIST

## 2021-03-22 NOTE — PROGRESS NOTES
Physical Therapy Daily Progress Note    Patient: Zuleyka Maharaj   : 1955  Diagnosis/ICD-10 Code:     Diagnosis Plan   1. Generalized weakness     2. History of COVID-19     3. Decreased strength, endurance, and mobility       Referring practitioner: Daniel DAHL*  Date of Initial Visit: Type: THERAPY  Noted: 2021  Today's Date: 3/22/2021  Patient seen for 11 sessions      PT Recheck Due: 2021  PT MD Visit: May 2021       Zuleyka Maharaj         Subjective Evaluation    History of Present Illness    Subjective comment: Pt offers no complaints of pain.  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  Pain  No pain reported             Objective   See Exercise, Manual, and Modality Logs for complete treatment.       Assessment & Plan     Assessment  Assessment details: Pt with good effort despite reports of being fatigues.  SPO2 monitored throughout tx with ranges of 93-96% .  No increased reports of pain with session this date.  Pt demonstrates step to gait pattern on 8 laps primarily but was able to perform 2 laps step over step.      Goals  Plan Goals: Short Term Goals:  1) Patient I with HEP and have additoins/changes by next recertification. (met)    2) Patient able to ambulate 300 feet with SC and no SOA and no LOB and O2 saturation >= 94%.  (met)    3) Patient able to perform sit to/from stand with 1 UE A = WB B LE x7, with good eccentric control in 30 seconds. (met)    4) TUG in <= 20 seconds with SC assistive device, good eccentric control and safely with no LOB.  (met)    5) 2 minute walk test covering a distance of 300 feet utilizing no assistive device with no SOA and no LOB, O2 saturation >= 94%. (met)    6) B Shoulder flexion/abduction AROM >= 105° each. (partially met, met for flexion)    7) B bicep/tricep >= 4/5.    8) B hips >= 4-/5 in all directions. (partially met)    9) Patient to be utilizing pursed lip  breathing to help keep O2 up when exercising and exertion. (MET)    Long Term Goals:  1) B UE >= 4+/5    2) B Shoulder flexion/abduction AROM >= 125° each.    3) B QS/HS >= 4+/5.    4) B hips >= 4/5 in all directions.    5) Patient able to ascend/descend 3 steps reciprocally with 1 hand rail assist, minimal SOA x10 reps. (progressing/ongoing)     6) Patient able to ambulate with no assistive device non-antalgicaly >= 1200 feet.    7) TUG in <= 20 seconds with no assistive device, good eccentric control and safely with no LOB.     8) 6 minute walk test covering a distance of >= 1/4 mile utilizing no assistive device with minimal SOA.    9) Patient to be I with final HEP.    Plan  Plan details: Next visit add shuttle      Progress per Plan of Care and Progress strengthening /stabilization /functional activity            Timed:  Manual Therapy:         mins  59866;  Therapeutic Exercise:    45     mins  01141;   Aquatic Therex :        mins  87822    Neuromuscular Melissa:        mins  34622;    Therapeutic Activity:          mins  97071;     Gait Training:           mins  81509;     Ultrasound:          mins  73574;    Electrical Stimulation:         mins  71273 ( );    Untimed:  Electrical Stimulation:         mins  04449 ( );  Mechanical Traction:         mins  92926;   Orthotic fit/train:         mins  58375    Timed Treatment:   45   mins   Total Treatment:     45   mins  Shayy Jesus PTA  Physical Therapist Assistant        This document has been electronically signed by Shayy Jesus PTA on March 22, 2021 15:17 CDT

## 2021-03-24 ENCOUNTER — TREATMENT (OUTPATIENT)
Dept: PHYSICAL THERAPY | Facility: CLINIC | Age: 66
End: 2021-03-24

## 2021-03-24 DIAGNOSIS — R53.1 DECREASED STRENGTH, ENDURANCE, AND MOBILITY: ICD-10-CM

## 2021-03-24 DIAGNOSIS — R68.89 DECREASED STRENGTH, ENDURANCE, AND MOBILITY: ICD-10-CM

## 2021-03-24 DIAGNOSIS — Z74.09 DECREASED STRENGTH, ENDURANCE, AND MOBILITY: ICD-10-CM

## 2021-03-24 DIAGNOSIS — R53.1 GENERALIZED WEAKNESS: Primary | ICD-10-CM

## 2021-03-24 DIAGNOSIS — Z86.16 HISTORY OF COVID-19: ICD-10-CM

## 2021-03-24 PROCEDURE — 97110 THERAPEUTIC EXERCISES: CPT | Performed by: PHYSICAL THERAPIST

## 2021-03-24 NOTE — PROGRESS NOTES
Physical Therapy Daily Progress Note    Patient: Zuleyka Maharaj   : 1955  Diagnosis/ICD-10 Code:     Diagnosis Plan   1. Generalized weakness     2. History of COVID-19     3. Decreased strength, endurance, and mobility       Referring practitioner: Daniel DAHL*  Date of Initial Visit: Type: THERAPY  Noted: 2021  Today's Date: 3/24/2021  Patient seen for 12 sessions      PT Recheck Due: 2021  PT MD Visit: May 2021       Zuleyka Maharaj         Subjective Evaluation    History of Present Illness    Subjective comment: Pt states her R knee is hurting her today due to banging it on a door last night by accident.  No other issues or complaints this date.  States she still gets tired but it has gotten much better.          Objective   See Exercise, Manual, and Modality Logs for complete treatment.       Assessment & Plan     Assessment  Assessment details: Pt with good effort with all therex this date.  Struggles with wall pushups.  No reports of pain/discomfort only fatigue.  Decreased rest breaks required during PT session this date.      Goals  Plan Goals: Short Term Goals:  1) Patient I with HEP and have additoins/changes by next recertification. (met)    2) Patient able to ambulate 300 feet with SC and no SOA and no LOB and O2 saturation >= 94%.  (met)    3) Patient able to perform sit to/from stand with 1 UE A = WB B LE x7, with good eccentric control in 30 seconds. (met)    4) TUG in <= 20 seconds with SC assistive device, good eccentric control and safely with no LOB.  (met)    5) 2 minute walk test covering a distance of 300 feet utilizing no assistive device with no SOA and no LOB, O2 saturation >= 94%. (met)    6) B Shoulder flexion/abduction AROM >= 105° each. (partially met, met for flexion)    7) B bicep/tricep >= 4/5.    8) B hips >= 4-/5 in all directions. (partially met)    9) Patient to be utilizing pursed lip breathing to help keep O2 up when exercising and exertion.  (MET)    Long Term Goals:  1) B UE >= 4+/5    2) B Shoulder flexion/abduction AROM >= 125° each.    3) B QS/HS >= 4+/5.    4) B hips >= 4/5 in all directions.    5) Patient able to ascend/descend 3 steps reciprocally with 1 hand rail assist, minimal SOA x10 reps. (progressing/ongoing)     6) Patient able to ambulate with no assistive device non-antalgicaly >= 1200 feet.    7) TUG in <= 20 seconds with no assistive device, good eccentric control and safely with no LOB.     8) 6 minute walk test covering a distance of >= 1/4 mile utilizing no assistive device with minimal SOA.    9) Patient to be I with final HEP.    Plan  Plan details: Next visit add quadraped shoulder taps if tolerated.        Progress per Plan of Care and Progress strengthening /stabilization /functional activity            Timed:  Manual Therapy:         mins  10262;  Therapeutic Exercise:    45     mins  23570;   Aquatic Therex :        mins  19314    Neuromuscular Melissa:        mins  05416;    Therapeutic Activity:          mins  85730;     Gait Training:           mins  81773;     Ultrasound:          mins  04947;    Electrical Stimulation:         mins  17586 ( );    Untimed:  Electrical Stimulation:         mins  19677 ( );  Mechanical Traction:         mins  49376;   Orthotic fit/train:         mins  93354    Timed Treatment:   45   mins   Total Treatment:     45   mins  Shayy Jesus PTA  Physical Therapist Assistant        This document has been electronically signed by Shayy Jesus PTA on March 24, 2021 15:15 CDT

## 2021-03-29 ENCOUNTER — TREATMENT (OUTPATIENT)
Dept: PHYSICAL THERAPY | Facility: CLINIC | Age: 66
End: 2021-03-29

## 2021-03-29 DIAGNOSIS — R68.89 DECREASED STRENGTH, ENDURANCE, AND MOBILITY: ICD-10-CM

## 2021-03-29 DIAGNOSIS — R53.1 GENERALIZED WEAKNESS: Primary | ICD-10-CM

## 2021-03-29 DIAGNOSIS — Z86.16 HISTORY OF COVID-19: ICD-10-CM

## 2021-03-29 DIAGNOSIS — Z74.09 DECREASED STRENGTH, ENDURANCE, AND MOBILITY: ICD-10-CM

## 2021-03-29 DIAGNOSIS — R53.1 DECREASED STRENGTH, ENDURANCE, AND MOBILITY: ICD-10-CM

## 2021-03-29 PROCEDURE — 97110 THERAPEUTIC EXERCISES: CPT | Performed by: PHYSICAL THERAPIST

## 2021-03-29 NOTE — PROGRESS NOTES
Physical Therapy Daily Progress Note    Patient: Zuleyka Maharaj   : 1955  Diagnosis/ICD-10 Code:     Diagnosis Plan   1. Generalized weakness     2. History of COVID-19     3. Decreased strength, endurance, and mobility       Referring practitioner: Daniel DAHL*  Date of Initial Visit: Type: THERAPY  Noted: 2021  Today's Date: 3/29/2021  Patient seen for 13 sessions      PT Recheck Due: 2021  PT MD Visit: 2021       Zuleyka Maharaj reports: 60% improvement         Subjective       Objective          Active Range of Motion   Left Shoulder   Flexion: 100 degrees   Abduction: 110 degrees     Right Shoulder   Flexion: 100 degrees   Abduction: 105 degrees       See Exercise, Manual, and Modality Logs for complete treatment.       Assessment & Plan     Assessment  Assessment details: Pt without increased pain.  Great effort with all therex.  Unable to tolerate quadriped position due to history of B knee replacement.  Modified to inclined position at end of tx table with shoulder taps.  Pt reports doing wall push ups at home with HEP.  States the biggest struggle is lack of strength in BUE R>L .  Limited use of SC within clinic.      Goals  Plan Goals: Short Term Goals:  1) Patient I with HEP and have additoins/changes by next recertification. (met)    2) Patient able to ambulate 300 feet with SC and no SOA and no LOB and O2 saturation >= 94%.  (met)    3) Patient able to perform sit to/from stand with 1 UE A = WB B LE x7, with good eccentric control in 30 seconds. (met)    4) TUG in <= 20 seconds with SC assistive device, good eccentric control and safely with no LOB.  (met)    5) 2 minute walk test covering a distance of 300 feet utilizing no assistive device with no SOA and no LOB, O2 saturation >= 94%. (met)    6) B Shoulder flexion/abduction AROM >= 105° each. (partially met, met for flexion)    7) B bicep/tricep >= 4/5.    8) B hips >= 4-/5 in all directions. (partially  met)    9) Patient to be utilizing pursed lip breathing to help keep O2 up when exercising and exertion. (MET)    Long Term Goals:  1) B UE >= 4+/5    2) B Shoulder flexion/abduction AROM >= 125° each.    3) B QS/HS >= 4+/5.    4) B hips >= 4/5 in all directions.    5) Patient able to ascend/descend 3 steps reciprocally with 1 hand rail assist, minimal SOA x10 reps. (progressing/ongoing)     6) Patient able to ambulate with no assistive device non-antalgicaly >= 1200 feet.    7) TUG in <= 20 seconds with no assistive device, good eccentric control and safely with no LOB.     8) 6 minute walk test covering a distance of >= 1/4 mile utilizing no assistive device with minimal SOA.    9) Patient to be I with final HEP.    Plan  Plan details: PT re-check next visit      Progress per Plan of Care and Progress strengthening /stabilization /functional activity            Timed:  Manual Therapy:         mins  32339;  Therapeutic Exercise:    48     mins  64049;   Aquatic Therex :        mins  93340    Neuromuscular Melissa:        mins  29991;    Therapeutic Activity:          mins  11341;     Gait Training:           mins  42294;     Ultrasound:          mins  06297;    Electrical Stimulation:         mins  21974 ( );    Untimed:  Electrical Stimulation:         mins  81306 ( );  Mechanical Traction:         mins  44585;   Orthotic fit/train:         mins  66563    Timed Treatment:   48   mins   Total Treatment:     48   mins  Shayy Jesus PTA  Physical Therapist Assistant        This document has been electronically signed by Shayy Jesus PTA on March 29, 2021 15:04 CDT

## 2021-03-31 ENCOUNTER — TREATMENT (OUTPATIENT)
Dept: PHYSICAL THERAPY | Facility: CLINIC | Age: 66
End: 2021-03-31

## 2021-03-31 DIAGNOSIS — R68.89 DECREASED STRENGTH, ENDURANCE, AND MOBILITY: ICD-10-CM

## 2021-03-31 DIAGNOSIS — Z74.09 DECREASED STRENGTH, ENDURANCE, AND MOBILITY: ICD-10-CM

## 2021-03-31 DIAGNOSIS — R53.1 GENERALIZED WEAKNESS: Primary | ICD-10-CM

## 2021-03-31 DIAGNOSIS — R53.1 DECREASED STRENGTH, ENDURANCE, AND MOBILITY: ICD-10-CM

## 2021-03-31 DIAGNOSIS — Z86.16 HISTORY OF COVID-19: ICD-10-CM

## 2021-03-31 PROCEDURE — 97530 THERAPEUTIC ACTIVITIES: CPT | Performed by: PHYSICAL THERAPIST

## 2021-03-31 PROCEDURE — 97110 THERAPEUTIC EXERCISES: CPT | Performed by: PHYSICAL THERAPIST

## 2021-03-31 NOTE — PROGRESS NOTES
Re-Assessment / Re-Certification      Patient: Zuleyka Maharaj   : 1955  Diagnosis/ICD-10 Code:  Generalized weakness [R53.1]  Referring practitioner: Daniel NGUYEN  Date of Initial Visit: Type: THERAPY  Noted: 2021  Today's Date: 3/31/2021  Patient seen for 14 sessions    Recertification: 2021   Next MD appt: 2021.    Subjective:   Zuleyka Maharaj reports: 50-60% improvement  Subjective Questionnaire: QuickDASH: 27.78%  LEFS: 40/80  Clinical Progress: improved  Home Program Compliance: Yes  Treatment has included: therapeutic exercise, neuromuscular re-education, manual therapy, therapeutic activity and gait training    Subjective Evaluation    History of Present Illness    Subjective comment: Patient reports she feels like she is improving overall. She reprots she feels like her arms are still the least amount improved.Pain  Current pain ratin         Objective          Static Posture     Head  Forward.    Shoulders  Rounded.    Thoracic Spine  Hyperkyphosis.    Lumbar Spine   Flattened.     Observations   Left Shoulder   Positive for trophic changes.     Right Shoulder  Positive for trophic changes.     Additional Shoulder Observation Details  Significant crepitus in the B joints    Active Range of Motion   Left Shoulder   Flexion: 112 degrees   Abduction: 90 degrees     Right Shoulder   Flexion: 120 degrees   Abduction: 90 degrees     Passive Range of Motion   Left Shoulder   Normal passive range of motion    Right Shoulder   Normal passive range of motion    Strength/Myotome Testing     Left Shoulder     Planes of Motion   Flexion: 3-   Abduction: 3-     Right Shoulder     Planes of Motion   Flexion: 3-   Abduction: 3-     Left Elbow   Flexion: 4+  Extension: 4+    Right Elbow   Flexion: 4+  Extension: 4+    Left Hip   Planes of Motion   Flexion: 4  Extension: 4  Abduction: 4  Adduction: 4    Right Hip   Planes of Motion   Flexion: 4  Extension: 4  Abduction:  "4  Adduction: 4    Left Knee   Flexion: 5  Extension: 5    Right Knee   Flexion: 5  Extension: 5    Left Ankle/Foot   Dorsiflexion: 5    Right Ankle/Foot   Dorsiflexion: 5    Ambulation   Weight-Bearing Status   Weight-Bearing Status (Left): weight-bearing as tolerated   Weight-Bearing Status (Right): weight-bearing as tolerated    Assistive device used: single point cane and none    Additional Weight-Bearing Status Details  No assistive device within the clinic.    Ambulation: Level Surfaces   Ambulation with assistive device: independent  Ambulation without assistive device: independent    Ambulation: Stairs   Ascend stairs: independent  Pattern: reciprocal  Railings: two rails  Descend stairs: independent  Pattern: reciprocal  Railings: two rails    Observational Gait   Gait: within functional limits   Left step length and right step length within functional limits. Decreased walking speed and stride length.   Base of support: normal    Additional Observational Gait Details  Occasional scissoring at times.    Functional Assessment     Comments  30\" sit to/from stand test: x8.5 reps, no UE A, good eccentric control.  TUG: no assistive device: 15\", 15\",  no UE A sit to stand, good eccentric control.  6 minute walk test: 350', then rested for 1 minute and 15 seconds, then continues another 350'. 95% O2 saturation post walk test.        Assessment & Plan     Assessment  Impairments: activity intolerance, impaired balance and impaired physical strength  Assessment details: Patient has improvements in LE strength the most. Fatigues easily with walking and standing activities. Safe without assistive device ambulating. Patient did well with [facility] UE exercises today.   Prognosis: fair  Prognosis details: Barriers to Rehab: Include significant or possible arthritic/degenerative changes that have occurred within the joints/spine, The patient's obesity, The patient's generally deconditioned state.    Safety Issues: Fall " risk    Functional Limitations: carrying objects, lifting, walking, pulling, pushing, stooping, reaching overhead and unable to perform repetitive tasks  Goals  Plan Goals: Short Term Goals:  1) Patient I with HEP and have additoins/changes by next recertification. (met)    2) Patient able to ambulate 300 feet with SC and no SOA and no LOB and O2 saturation >= 94%.  (met)    3) Patient able to perform sit to/from stand with 1 UE A = WB B LE x7, with good eccentric control in 30 seconds. (met)    4) TUG in <= 20 seconds with SC assistive device, good eccentric control and safely with no LOB.  (met)    5) 2 minute walk test covering a distance of 300 feet utilizing no assistive device with no SOA and no LOB, O2 saturation >= 94%. (met)    6) B Shoulder flexion/abduction AROM >= 105° each. (partially met, met for flexion)    7) B bicep/tricep >= 4/5. (met)    8) B hips >= 4-/5 in all directions. (met)    9) Patient to be utilizing pursed lip breathing to help keep O2 up when exercising and exertion. (MET)    Long Term Goals:  1) B UE >= 4+/5 (ongoing)    2) B Shoulder flexion/abduction AROM >= 125° each. (ongoing/progressing)    3) B QS/HS >= 4+/5. (met)    4) B hips >= 4/5 in all directions. (partially met/ongoing)    5) Patient able to ascend/descend 3 steps reciprocally with 1 hand rail assist, minimal SOA x10 reps. (progressing/ongoing)     6) Patient able to ambulate with no assistive device non-antalgicaly >= 1200 feet. (ongoing)    7) TUG in <= 20 seconds with no assistive device, good eccentric control and safely with no LOB. (met)    8) 6 minute walk test covering a distance of >= 1/4 mile utilizing no assistive device with minimal SOA.    9) Patient to be I with final HEP.    Plan  Therapy options: will be seen for skilled physical therapy services  Planned therapy interventions: ADL retraining, balance/weight-bearing training, body mechanics training, flexibility, functional ROM exercises, gait training,  home exercise program, IADL retraining, motor coordination training, neuromuscular re-education, postural training, strengthening, stretching, therapeutic activities and transfer training  Duration in visits: 12  Treatment plan discussed with: patient  Plan details: Progress overall ROM, strength, and endurance. Concentrate on UE ROM/strength and overall walking endurance.      Other therapeutic activities and/or exercises will be prescribed depending on the patients progress or lack there of.     Visit Diagnoses:    ICD-10-CM ICD-9-CM   1. Generalized weakness  R53.1 780.79   2. History of COVID-19  Z86.16 V12.09   3. Decreased strength, endurance, and mobility  R53.1 780.79    Z74.09 780.99    R68.89        Progress toward previous goals: Partially Met        Recommendations: Continue as planned  Timeframe: 6 weeks  Prognosis to achieve goals: fair    PT Signature: Eveline Schafer PT DPT, CSCS      Based upon review of the patient's progress and continued therapy plan, it is my medical opinion that Zuleyka Maharaj should continue physical therapy treatment at [facility] PHYSICAL THERAPY  [facility] [address] [place] [address] [phone].    Signature: __________________________________  Daniel Penn MD    Timed:  Manual Therapy:         mins  90616;  Therapeutic Exercise:    35     mins  31685;     Neuromuscular Melissa:        mins  63080;    Therapeutic Activity:     10     mins  90602;     Gait Training:           mins  26901;     Ultrasound:          mins  78106;    Paraffin:        mins  98958;  Electrical Stimulation:         mins  99218 ( );    Untimed:  Electrical Stimulation:         mins  54575 ( );  Mechanical Traction:         mins  55250;     Timed Treatment:   45   mins   Total Treatment:     45   mins            This document has been electronically signed by Eveline Schafer PT DPT, CSCS on March 31, 2021 15:09 CDT

## 2021-04-05 ENCOUNTER — TREATMENT (OUTPATIENT)
Dept: PHYSICAL THERAPY | Facility: CLINIC | Age: 66
End: 2021-04-05

## 2021-04-05 DIAGNOSIS — Z86.16 HISTORY OF COVID-19: ICD-10-CM

## 2021-04-05 DIAGNOSIS — Z74.09 DECREASED STRENGTH, ENDURANCE, AND MOBILITY: ICD-10-CM

## 2021-04-05 DIAGNOSIS — R53.1 GENERALIZED WEAKNESS: Primary | ICD-10-CM

## 2021-04-05 DIAGNOSIS — R68.89 DECREASED STRENGTH, ENDURANCE, AND MOBILITY: ICD-10-CM

## 2021-04-05 DIAGNOSIS — R53.1 DECREASED STRENGTH, ENDURANCE, AND MOBILITY: ICD-10-CM

## 2021-04-05 PROCEDURE — 97110 THERAPEUTIC EXERCISES: CPT | Performed by: PHYSICAL THERAPIST

## 2021-04-05 NOTE — PROGRESS NOTES
Physical Therapy Daily Progress Note    Patient: Zuleyka Maharaj   : 1955  Diagnosis/ICD-10 Code:     Diagnosis Plan   1. Generalized weakness     2. History of COVID-19     3. Decreased strength, endurance, and mobility       Referring practitioner: Daniel DAHL*  Date of Initial Visit: Type: THERAPY  Noted: 2021  Today's Date: 2021  Patient seen for 15 sessions      PT Recheck Due: 2021  PT MD Visit: 2021       Zuleyka Maharaj         Subjective       Objective   See Exercise, Manual, and Modality Logs for complete treatment.       Assessment & Plan     Assessment  Assessment details: Pt tolerated therex well with addition of weights and weight bearing activities.  BUE fatigue with wall slides.  No complaints of pain.  3 rest breaks required during tx session this date.     Goals  Plan Goals: Short Term Goals:  1) Patient I with HEP and have additoins/changes by next recertification. (met)    2) Patient able to ambulate 300 feet with SC and no SOA and no LOB and O2 saturation >= 94%.  (met)    3) Patient able to perform sit to/from stand with 1 UE A = WB B LE x7, with good eccentric control in 30 seconds. (met)    4) TUG in <= 20 seconds with SC assistive device, good eccentric control and safely with no LOB.  (met)    5) 2 minute walk test covering a distance of 300 feet utilizing no assistive device with no SOA and no LOB, O2 saturation >= 94%. (met)    6) B Shoulder flexion/abduction AROM >= 105° each. (partially met, met for flexion)    7) B bicep/tricep >= 4/5. (met)    8) B hips >= 4-/5 in all directions. (met)    9) Patient to be utilizing pursed lip breathing to help keep O2 up when exercising and exertion. (MET)    Long Term Goals:  1) B UE >= 4+/5 (ongoing)    2) B Shoulder flexion/abduction AROM >= 125° each. (ongoing/progressing)    3) B QS/HS >= 4+/5. (met)    4) B hips >= 4/5 in all directions. (partially met/ongoing)    5) Patient able to ascend/descend 3  steps reciprocally with 1 hand rail assist, minimal SOA x10 reps. (progressing/ongoing)     6) Patient able to ambulate with no assistive device non-antalgicaly >= 1200 feet. (ongoing)    7) TUG in <= 20 seconds with no assistive device, good eccentric control and safely with no LOB. (met)    8) 6 minute walk test covering a distance of >= 1/4 mile utilizing no assistive device with minimal SOA.    9) Patient to be I with final HEP.    Plan  Plan details: Next visit add horizontal ABD at wall       Progress per Plan of Care and Progress strengthening /stabilization /functional activity            Timed:  Manual Therapy:         mins  14288;  Therapeutic Exercise:    48     mins  67527;   Aquatic Therex :        mins  57810    Neuromuscular Melissa:        mins  22111;    Therapeutic Activity:          mins  89651;     Gait Training:           mins  56066;     Ultrasound:          mins  20333;    Electrical Stimulation:         mins  13167 ( );    Untimed:  Electrical Stimulation:         mins  41165 ( );  Mechanical Traction:         mins  31028;   Orthotic fit/train:         mins  09634    Timed Treatment:   48   mins   Total Treatment:     48   mins  Shayy Jesus PTA  Physical Therapist Assistant        This document has been electronically signed by Shayy Jesus PTA on April 5, 2021 15:26 CDT

## 2021-04-12 ENCOUNTER — TREATMENT (OUTPATIENT)
Dept: PHYSICAL THERAPY | Facility: CLINIC | Age: 66
End: 2021-04-12

## 2021-04-12 DIAGNOSIS — R68.89 DECREASED STRENGTH, ENDURANCE, AND MOBILITY: ICD-10-CM

## 2021-04-12 DIAGNOSIS — Z86.16 HISTORY OF COVID-19: ICD-10-CM

## 2021-04-12 DIAGNOSIS — R53.1 DECREASED STRENGTH, ENDURANCE, AND MOBILITY: ICD-10-CM

## 2021-04-12 DIAGNOSIS — Z74.09 DECREASED STRENGTH, ENDURANCE, AND MOBILITY: ICD-10-CM

## 2021-04-12 DIAGNOSIS — R53.1 GENERALIZED WEAKNESS: Primary | ICD-10-CM

## 2021-04-12 PROCEDURE — 97110 THERAPEUTIC EXERCISES: CPT | Performed by: PHYSICAL THERAPIST

## 2021-04-12 NOTE — PROGRESS NOTES
Physical Therapy Daily Progress Note    Patient: Zuleyka Maharaj   : 1955  Diagnosis/ICD-10 Code:     Diagnosis Plan   1. Generalized weakness     2. History of COVID-19     3. Decreased strength, endurance, and mobility       Referring practitioner: Daniel DAHL*  Date of Initial Visit: Type: THERAPY  Noted: 2021  Today's Date: 2021  Patient seen for 16 sessions      PT Recheck Due: 2021  PT MD Visit: 2021       Zuleyka Maharaj         Subjective       Objective   See Exercise, Manual, and Modality Logs for complete treatment.       Assessment & Plan     Assessment  Assessment details: Pt tolerates therex well.  Fatigues quickly with repetitions.  Some complaints of pain with AA shoulder flexion in R shoulder.  Increased resistance with tband exercises.     Goals  Plan Goals: Short Term Goals:  1) Patient I with HEP and have additoins/changes by next recertification. (met)    2) Patient able to ambulate 300 feet with SC and no SOA and no LOB and O2 saturation >= 94%.  (met)    3) Patient able to perform sit to/from stand with 1 UE A = WB B LE x7, with good eccentric control in 30 seconds. (met)    4) TUG in <= 20 seconds with SC assistive device, good eccentric control and safely with no LOB.  (met)    5) 2 minute walk test covering a distance of 300 feet utilizing no assistive device with no SOA and no LOB, O2 saturation >= 94%. (met)    6) B Shoulder flexion/abduction AROM >= 105° each. (partially met, met for flexion)    7) B bicep/tricep >= 4/5. (met)    8) B hips >= 4-/5 in all directions. (met)    9) Patient to be utilizing pursed lip breathing to help keep O2 up when exercising and exertion. (MET)    Long Term Goals:  1) B UE >= 4+/5 (ongoing)    2) B Shoulder flexion/abduction AROM >= 125° each. (ongoing/progressing)    3) B QS/HS >= 4+/5. (met)    4) B hips >= 4/5 in all directions. (partially met/ongoing)    5) Patient able to ascend/descend 3 steps reciprocally  with 1 hand rail assist, minimal SOA x10 reps. (progressing/ongoing)     6) Patient able to ambulate with no assistive device non-antalgicaly >= 1200 feet. (ongoing)    7) TUG in <= 20 seconds with no assistive device, good eccentric control and safely with no LOB. (met)    8) 6 minute walk test covering a distance of >= 1/4 mile utilizing no assistive device with minimal SOA.    9) Patient to be I with final HEP.    Plan  Plan details: Next visit add chicken foot at wall       Progress per Plan of Care and Progress strengthening /stabilization /functional activity            Timed:  Manual Therapy:         mins  08741;  Therapeutic Exercise:    49     mins  84487;   Aquatic Therex :        mins  11651    Neuromuscular Melissa:        mins  88598;    Therapeutic Activity:          mins  61302;     Gait Training:           mins  59888;     Ultrasound:          mins  21672;    Electrical Stimulation:         mins  83274 ( );    Untimed:  Electrical Stimulation:         mins  10660 ( );  Mechanical Traction:         mins  84800;   Orthotic fit/train:         mins  02805    Timed Treatment:   49   mins   Total Treatment:     49   mins  Shayy Jesus PTA  Physical Therapist Assistant        This document has been electronically signed by Shayy Jesus PTA on April 12, 2021 15:15 CDT

## 2021-04-14 ENCOUNTER — TREATMENT (OUTPATIENT)
Dept: PHYSICAL THERAPY | Facility: CLINIC | Age: 66
End: 2021-04-14

## 2021-04-14 DIAGNOSIS — Z74.09 DECREASED STRENGTH, ENDURANCE, AND MOBILITY: ICD-10-CM

## 2021-04-14 DIAGNOSIS — R53.1 GENERALIZED WEAKNESS: Primary | ICD-10-CM

## 2021-04-14 DIAGNOSIS — Z86.16 HISTORY OF COVID-19: ICD-10-CM

## 2021-04-14 DIAGNOSIS — R68.89 DECREASED STRENGTH, ENDURANCE, AND MOBILITY: ICD-10-CM

## 2021-04-14 DIAGNOSIS — R53.1 DECREASED STRENGTH, ENDURANCE, AND MOBILITY: ICD-10-CM

## 2021-04-14 PROCEDURE — 97110 THERAPEUTIC EXERCISES: CPT | Performed by: PHYSICAL THERAPIST

## 2021-04-14 NOTE — PROGRESS NOTES
Physical Therapy Daily Progress Note        Patient: Zuleyka Maharaj   : 1955  Diagnosis/ICD-10 Code:  Generalized weakness [R53.1]  Referring practitioner: Daniel DAHL*  Date of Initial Visit: Type: THERAPY  Noted: 2021  Today's Date: 2021  Patient seen for 17 sessions    PT Recheck Due: 2021  PT MD Visit: 2021           Subjective Evaluation    History of Present Illness    Subjective comment: Pt c/o being stiff in her R knee due to the weather.  Pt reports 60-70% improvement.       Objective   See Exercise, Manual, and Modality Logs for complete treatment.       Assessment & Plan     Assessment  Assessment details: Pt required several cues to improve posture and ecc control during ex's today.  Pt also required 1 seated rest break during her treatment.    Goals  Plan Goals: Plan Goals: Short Term Goals:  1) Patient I with HEP and have additoins/changes by next recertification. (met)    2) Patient able to ambulate 300 feet with SC and no SOA and no LOB and O2 saturation >= 94%.  (met)    3) Patient able to perform sit to/from stand with 1 UE A = WB B LE x7, with good eccentric control in 30 seconds. (met)    4) TUG in <= 20 seconds with SC assistive device, good eccentric control and safely with no LOB.  (met)    5) 2 minute walk test covering a distance of 300 feet utilizing no assistive device with no SOA and no LOB, O2 saturation >= 94%. (met)    6) B Shoulder flexion/abduction AROM >= 105° each. (partially met, met for flexion)    7) B bicep/tricep >= 4/5. (met)    8) B hips >= 4-/5 in all directions. (met)    9) Patient to be utilizing pursed lip breathing to help keep O2 up when exercising and exertion. (MET)    Long Term Goals:  1) B UE >= 4+/5 (ongoing)    2) B Shoulder flexion/abduction AROM >= 125° each. (ongoing/progressing)    3) B QS/HS >= 4+/5. (met)    4) B hips >= 4/5 in all directions. (partially met/ongoing)    5) Patient able to ascend/descend 3  steps reciprocally with 1 hand rail assist, minimal SOA x10 reps. (progressing/ongoing)     6) Patient able to ambulate with no assistive device non-antalgicaly >= 1200 feet. (ongoing)    7) TUG in <= 20 seconds with no assistive device, good eccentric control and safely with no LOB. (met)    8) 6 minute walk test covering a distance of >= 1/4 mile utilizing no assistive device with minimal SOA.    9) Patient to be I with final HEP.    Plan  Plan details: Increase time on chicken foot ex next visit.  Review track walk using no AD.                  Manual Therapy:         mins  59619;  Therapeutic Exercise:    47     mins  18771;     Neuromuscular Melissa:        mins  51479;    Therapeutic Activity:          mins  68463;     Gait Training:           mins  61945;     Ultrasound:          mins  79762;    Electrical Stimulation:         mins  82587 ( );  Dry Needling          mins self-pay    Timed Treatment:   47   mins   Total Treatment:     47   mins    Cecille Esparza PTA  Physical Therapist Assistant

## 2021-04-19 ENCOUNTER — TREATMENT (OUTPATIENT)
Dept: PHYSICAL THERAPY | Facility: CLINIC | Age: 66
End: 2021-04-19

## 2021-04-19 DIAGNOSIS — R53.1 GENERALIZED WEAKNESS: Primary | ICD-10-CM

## 2021-04-19 DIAGNOSIS — R53.1 DECREASED STRENGTH, ENDURANCE, AND MOBILITY: ICD-10-CM

## 2021-04-19 DIAGNOSIS — Z74.09 DECREASED STRENGTH, ENDURANCE, AND MOBILITY: ICD-10-CM

## 2021-04-19 DIAGNOSIS — R68.89 DECREASED STRENGTH, ENDURANCE, AND MOBILITY: ICD-10-CM

## 2021-04-19 DIAGNOSIS — Z86.16 HISTORY OF COVID-19: ICD-10-CM

## 2021-04-19 PROCEDURE — 97110 THERAPEUTIC EXERCISES: CPT | Performed by: PHYSICAL THERAPIST

## 2021-04-19 PROCEDURE — 97530 THERAPEUTIC ACTIVITIES: CPT | Performed by: PHYSICAL THERAPIST

## 2021-04-19 NOTE — PROGRESS NOTES
Re-Assessment / Re-Certification      Patient: Zuleyka Maharaj   : 1955  Diagnosis/ICD-10 Code:  Generalized weakness [R53.1]  Referring practitioner: Daniel NUGYEN  Date of Initial Visit: Type: THERAPY  Noted: 2021  Today's Date: 2021  Patient seen for 18 sessions    Recertification: N/A   Next MD appt: 2021.    Subjective:   Zuleyka Maharaj reports: 75% improvement.  Subjective Questionnaire: QuickDASH: 18.18%  LEFS: 53/80  Clinical Progress: improved  Home Program Compliance: Yes  Treatment has included: therapeutic exercise, neuromuscular re-education, therapeutic activity, gait training and endurance    Subjective Evaluation    History of Present Illness    Subjective comment: Patient reports she is continueing to improve.Pain  Current pain ratin         Objective          Static Posture     Head  Forward.    Shoulders  Rounded.    Thoracic Spine  Hyperkyphosis.    Lumbar Spine   Flattened.     Observations   Left Shoulder   Positive for trophic changes.     Right Shoulder  Positive for trophic changes.     Additional Shoulder Observation Details  Significant crepitus in the B joints    Active Range of Motion   Left Shoulder   Flexion: 130 degrees   Abduction: 112 degrees     Right Shoulder   Flexion: 135 degrees   Abduction: 115 degrees     Passive Range of Motion   Left Shoulder   Normal passive range of motion    Right Shoulder   Normal passive range of motion    Strength/Myotome Testing     Left Shoulder     Planes of Motion   Flexion: 4   Abduction: 3+     Isolated Muscles   Biceps: 5   Triceps: 5     Right Shoulder     Planes of Motion   Flexion: 4   Abduction: 3+     Isolated Muscles   Biceps: 5   Triceps: 5     Left Elbow   Flexion: 4+  Extension: 4+    Right Elbow   Flexion: 4+  Extension: 4+    Left Hip   Planes of Motion   Flexion: 4+  Extension: 4+  Abduction: 4+  Adduction: 4+    Right Hip   Planes of Motion   Flexion: 4+  Extension: 4+  Abduction:  "4+  Adduction: 4+    Left Knee   Flexion: 5  Extension: 5    Right Knee   Flexion: 5  Extension: 5    Left Ankle/Foot   Dorsiflexion: 5    Right Ankle/Foot   Dorsiflexion: 5    Ambulation   Weight-Bearing Status   Weight-Bearing Status (Left): weight-bearing as tolerated   Weight-Bearing Status (Right): weight-bearing as tolerated   Assistive device used: none    Additional Weight-Bearing Status Details  No assistive device within the clinic.    Ambulation: Level Surfaces   Ambulation without assistive device: independent    Ambulation: Stairs   Ascend stairs: independent  Pattern: reciprocal  Railings: two rails  Descend stairs: independent  Pattern: reciprocal  Railings: two rails    Observational Gait   Gait: within functional limits   Left step length and right step length within functional limits. Decreased walking speed and stride length.   Base of support: normal    Functional Assessment     Comments  30\" sit to/from stand test: x8.5 reps, no UE A, good eccentric control.  TUG: no assistive device: 15\", 15\",  no UE A sit to stand, good eccentric control.  6 minute walk test: 725 feet, stopped with 15 seconds left due to LBP.        Assessment & Plan     Assessment  Impairments: activity intolerance, impaired balance and impaired physical strength  Assessment details: Patient walked further today than ever before wit the walk test. Main limiting factor was LBP, not fatigue. Patient carried SC during walk test for first 4 minutes but then used last 1 min 45 seconds due to her LBP, again patient reports not due to fatigue. Patient reports having LBP prior to this illness. Patient also reports she was never a walker and doesn't plan to start being a walker. Patient has met all goals except walk test and final HEP which needs to be reviewed over several visits du to the volume of exercises we have gone over with the patient.  Prognosis: fair  Prognosis details: Barriers to Rehab: Include significant or possible " arthritic/degenerative changes that have occurred within the joints/spine, The patient's obesity, The patient's generally deconditioned state.    Safety Issues: Fall risk    Functional Limitations: carrying objects, lifting, walking, pulling, pushing, stooping, reaching overhead and unable to perform repetitive tasks  Goals  Plan Goals: Plan Goals: Short Term Goals:  1) Patient I with HEP and have additoins/changes by next recertification. (met)    2) Patient able to ambulate 300 feet with SC and no SOA and no LOB and O2 saturation >= 94%.  (met)    3) Patient able to perform sit to/from stand with 1 UE A = WB B LE x7, with good eccentric control in 30 seconds. (met)    4) TUG in <= 20 seconds with SC assistive device, good eccentric control and safely with no LOB.  (met)    5) 2 minute walk test covering a distance of 300 feet utilizing no assistive device with no SOA and no LOB, O2 saturation >= 94%. (met)    6) B Shoulder flexion/abduction AROM >= 105° each. (partially met, met for flexion)    7) B bicep/tricep >= 4/5. (met)    8) B hips >= 4-/5 in all directions. (met)    9) Patient to be utilizing pursed lip breathing to help keep O2 up when exercising and exertion. (MET)    Long Term Goals:  1) B UE >= 4+/5 (ongoing)    2) B Shoulder flexion/abduction AROM >= 125° each. (partially met/ongoing/progressing)    3) B QS/HS >= 4+/5. (met)    4) B hips >= 4/5 in all directions. (met)    5) Patient able to ascend/descend 3 steps reciprocally with 1 hand rail assist, minimal SOA x10 reps. (met)     6) Patient able to ambulate with no assistive device non-antalgicaly >= 1200 feet. (not met)    7) TUG in <= 20 seconds with no assistive device, good eccentric control and safely with no LOB. (met)    8) 6 minute walk test covering a distance of >= 1/4 mile utilizing no assistive device with minimal SOA. (not met)    9) Patient to be I with final HEP. (ongoing)    Plan  Therapy options: will be seen for skilled physical  therapy services  Planned therapy interventions: ADL retraining, balance/weight-bearing training, body mechanics training, flexibility, functional ROM exercises, gait training, home exercise program, IADL retraining, motor coordination training, neuromuscular re-education, postural training, strengthening, stretching, therapeutic activities and transfer training  Duration in visits: 4  Treatment plan discussed with: patient  Plan details: 2 more weeks to ensure I with final HEP.      Other therapeutic activities and/or exercises will be prescribed depending on the patients progress or lack there of.     Visit Diagnoses:    ICD-10-CM ICD-9-CM   1. Generalized weakness  R53.1 780.79   2. History of COVID-19  Z86.16 V12.09   3. Decreased strength, endurance, and mobility  R53.1 780.79    Z74.09 780.99    R68.89        Progress toward previous goals: Partially Met        Recommendations: Continue with recommendations progressing to an I program gearingup for D/C at the end of next week  Timeframe: 2 weeks  Prognosis to achieve goals: good    PT Signature: Eveline Schafer, PT DPT, CSCS      Based upon review of the patient's progress and continued therapy plan, it is my medical opinion that Zuleyka Maharaj should continue physical therapy treatment at [facility] PHYSICAL THERAPY  [facility] [address] [place] [address] [phone].    Signature: __________________________________  Daniel Penn MD    Timed:  Manual Therapy:         mins  34681;  Therapeutic Exercise:    25     mins  67548;     Neuromuscular Melissa:        mins  72358;    Therapeutic Activity:     25     mins  92237;     Gait Training:           mins  18170;     Ultrasound:          mins  43506;    Paraffin:        mins  43916;  Electrical Stimulation:         mins  03419 ( );    Untimed:  Electrical Stimulation:         mins  63139 ( );  Mechanical Traction:         mins  73479;     Timed  Treatment:   50   mins   Total Treatment:     50   mins            This document has been electronically signed by Eveline Schafer PT DPT, CSCS on April 19, 2021 15:35 CDT

## 2021-04-21 ENCOUNTER — TREATMENT (OUTPATIENT)
Dept: PHYSICAL THERAPY | Facility: CLINIC | Age: 66
End: 2021-04-21

## 2021-04-21 DIAGNOSIS — Z86.16 HISTORY OF COVID-19: ICD-10-CM

## 2021-04-21 DIAGNOSIS — Z74.09 DECREASED STRENGTH, ENDURANCE, AND MOBILITY: ICD-10-CM

## 2021-04-21 DIAGNOSIS — R53.1 DECREASED STRENGTH, ENDURANCE, AND MOBILITY: ICD-10-CM

## 2021-04-21 DIAGNOSIS — R53.1 GENERALIZED WEAKNESS: Primary | ICD-10-CM

## 2021-04-21 DIAGNOSIS — R68.89 DECREASED STRENGTH, ENDURANCE, AND MOBILITY: ICD-10-CM

## 2021-04-21 PROCEDURE — 97110 THERAPEUTIC EXERCISES: CPT | Performed by: PHYSICAL THERAPIST

## 2021-04-21 NOTE — PROGRESS NOTES
Physical Therapy Daily Progress Note    Patient: Zuleyka Maharaj   : 1955  Diagnosis/ICD-10 Code:     Diagnosis Plan   1. Generalized weakness     2. History of COVID-19     3. Decreased strength, endurance, and mobility       Referring practitioner: Daniel NGUYEN  Date of Initial Visit: Type: THERAPY  Noted: 2021  Today's Date: 2021  Patient seen for 19 sessions      PT Recheck Due: N/A  PT MD Visit:        Zuleyka Maharaj      Subjective Evaluation    History of Present Illness    Subjective comment: patient reports that she does not do her exercises every day. states that she does them sometimes thursday and fridayand sometimes saturday and          Objective   See Exercise, Manual, and Modality Logs for complete treatment.       Assessment & Plan     Assessment  Assessment details: Patient reporting non compliance with HEP. Patient is fatigued with activities that require her to be on her feet. Gives good effort but it is evident that she is labored to complete. Patient is encouraged to complete HEP more often and to complete tasks at home that she can stand to complete vs sitting.     Goals  Plan Goals: Short Term Goals:  1) Patient I with HEP and have additoins/changes by next recertification. (met)    2) Patient able to ambulate 300 feet with SC and no SOA and no LOB and O2 saturation >= 94%.  (met)    3) Patient able to perform sit to/from stand with 1 UE A = WB B LE x7, with good eccentric control in 30 seconds. (met)    4) TUG in <= 20 seconds with SC assistive device, good eccentric control and safely with no LOB.  (met)    5) 2 minute walk test covering a distance of 300 feet utilizing no assistive device with no SOA and no LOB, O2 saturation >= 94%. (met)    6) B Shoulder flexion/abduction AROM >= 105° each. (partially met, met for flexion)    7) B bicep/tricep >= 4/5. (met)    8) B hips >= 4-/5 in all directions. (met)    9) Patient to be utilizing pursed  lip breathing to help keep O2 up when exercising and exertion. (MET)    Long Term Goals:  1) B UE >= 4+/5 (ongoing)    2) B Shoulder flexion/abduction AROM >= 125° each. (partially met/ongoing/progressing)    3) B QS/HS >= 4+/5. (met)    4) B hips >= 4/5 in all directions. (met)    5) Patient able to ascend/descend 3 steps reciprocally with 1 hand rail assist, minimal SOA x10 reps. (met)     6) Patient able to ambulate with no assistive device non-antalgicaly >= 1200 feet. (not met)    7) TUG in <= 20 seconds with no assistive device, good eccentric control and safely with no LOB. (met)    8) 6 minute walk test covering a distance of >= 1/4 mile utilizing no assistive device with minimal SOA. (not met)    9) Patient to be I with final HEP. (ongoing)    Plan  Plan details: Continue to review and finalize HEP with anticipated Discharge at the end of next week.       Progress per Plan of Care and Progress strengthening /stabilization /functional activity            Timed:  Manual Therapy:         mins  96112;  Therapeutic Exercise:    60     mins  88089;   Aquatic Therex :        mins  96341    Neuromuscular Melissa:        mins  98734;    Therapeutic Activity:          mins  00637;     Gait Training:           mins  00113;     Ultrasound:          mins  98627;    Electrical Stimulation:         mins  32912 ( );    Untimed:  Electrical Stimulation:         mins  14703 ( );  Mechanical Traction:         mins  10584;   Paraffin:         mins  80317;  Orthotic fit/train:         mins  49424  Iontophoresis:          mins  01155    Timed Treatment:   62   mins   Total Treatment:     62   mins  Dona Coffey PTA  Physical Therapist Assistant      This document has been electronically signed by Dona Coffey PTA on April 21, 2021 14:37 CDT

## 2021-04-26 ENCOUNTER — TREATMENT (OUTPATIENT)
Dept: PHYSICAL THERAPY | Facility: CLINIC | Age: 66
End: 2021-04-26

## 2021-04-26 DIAGNOSIS — R53.1 GENERALIZED WEAKNESS: Primary | ICD-10-CM

## 2021-04-26 DIAGNOSIS — Z74.09 DECREASED STRENGTH, ENDURANCE, AND MOBILITY: ICD-10-CM

## 2021-04-26 DIAGNOSIS — R68.89 DECREASED STRENGTH, ENDURANCE, AND MOBILITY: ICD-10-CM

## 2021-04-26 DIAGNOSIS — Z86.16 HISTORY OF COVID-19: ICD-10-CM

## 2021-04-26 DIAGNOSIS — R53.1 DECREASED STRENGTH, ENDURANCE, AND MOBILITY: ICD-10-CM

## 2021-04-26 PROCEDURE — 97110 THERAPEUTIC EXERCISES: CPT | Performed by: PHYSICAL THERAPIST

## 2021-04-26 NOTE — PROGRESS NOTES
Physical Therapy Daily Progress Note    Patient: Zuleyka Maharaj   : 1955  Diagnosis/ICD-10 Code:     Diagnosis Plan   1. Generalized weakness     2. History of COVID-19     3. Decreased strength, endurance, and mobility       Referring practitioner: Daniel NGUYEN  Date of Initial Visit: Type: THERAPY  Noted: 2021  Today's Date: 2021  Patient seen for 20 sessions      PT Recheck Due: N/A  PT MD Visit: 2021       Zuleyka Maharaj       Subjective       Objective   See Exercise, Manual, and Modality Logs for complete treatment.       Assessment & Plan     Assessment  Assessment details: Pt with good effort.  Increased distance with 6 min walk test.  No complaints of pain only fatigue.  Discussed upcoming discharge and importance of HEP compliance to maintain strength gains made since start of therapy.  Pt verbalized agreement and plans to comply at home after discharge.      Goals  Plan Goals: Short Term Goals:  1) Patient I with HEP and have additoins/changes by next recertification. (met)    2) Patient able to ambulate 300 feet with SC and no SOA and no LOB and O2 saturation >= 94%.  (met)    3) Patient able to perform sit to/from stand with 1 UE A = WB B LE x7, with good eccentric control in 30 seconds. (met)    4) TUG in <= 20 seconds with SC assistive device, good eccentric control and safely with no LOB.  (met)    5) 2 minute walk test covering a distance of 300 feet utilizing no assistive device with no SOA and no LOB, O2 saturation >= 94%. (met)    6) B Shoulder flexion/abduction AROM >= 105° each. (partially met, met for flexion)    7) B bicep/tricep >= 4/5. (met)    8) B hips >= 4-/5 in all directions. (met)    9) Patient to be utilizing pursed lip breathing to help keep O2 up when exercising and exertion. (MET)    Long Term Goals:  1) B UE >= 4+/5 (ongoing)    2) B Shoulder flexion/abduction AROM >= 125° each. (partially met/ongoing/progressing)    3) B QS/HS >= 4+/5.  (met)    4) B hips >= 4/5 in all directions. (met)    5) Patient able to ascend/descend 3 steps reciprocally with 1 hand rail assist, minimal SOA x10 reps. (met)     6) Patient able to ambulate with no assistive device non-antalgicaly >= 1200 feet. (not met)    7) TUG in <= 20 seconds with no assistive device, good eccentric control and safely with no LOB. (met)    8) 6 minute walk test covering a distance of >= 1/4 mile utilizing no assistive device with minimal SOA. (not met)    9) Patient to be I with final HEP. (ongoing)    Plan  Plan details: Review finalized HEP with plans to DC with I HEP      Anticipate DC next Visit            Timed:  Manual Therapy:         mins  95534;  Therapeutic Exercise:    50     mins  79773;   Aquatic Therex :        mins  29151    Neuromuscular Melissa:        mins  75231;    Therapeutic Activity:          mins  58423;     Gait Training:           mins  99704;     Ultrasound:          mins  11433;    Electrical Stimulation:         mins  98667 ( );    Untimed:  Electrical Stimulation:         mins  90671 ( );  Mechanical Traction:         mins  02321;   Orthotic fit/train:         mins  54267    Timed Treatment:   50   mins   Total Treatment:     50   mins  Shayy Jesus PTA  Physical Therapist Assistant        This document has been electronically signed by Shayy Jesus PTA on April 26, 2021 15:12 CDT

## 2021-04-28 ENCOUNTER — TREATMENT (OUTPATIENT)
Dept: PHYSICAL THERAPY | Facility: CLINIC | Age: 66
End: 2021-04-28

## 2021-04-28 DIAGNOSIS — R68.89 DECREASED STRENGTH, ENDURANCE, AND MOBILITY: ICD-10-CM

## 2021-04-28 DIAGNOSIS — R53.1 GENERALIZED WEAKNESS: Primary | ICD-10-CM

## 2021-04-28 DIAGNOSIS — Z74.09 DECREASED STRENGTH, ENDURANCE, AND MOBILITY: ICD-10-CM

## 2021-04-28 DIAGNOSIS — R53.1 DECREASED STRENGTH, ENDURANCE, AND MOBILITY: ICD-10-CM

## 2021-04-28 DIAGNOSIS — Z86.16 HISTORY OF COVID-19: ICD-10-CM

## 2021-04-28 PROCEDURE — 97110 THERAPEUTIC EXERCISES: CPT | Performed by: PHYSICAL THERAPIST

## 2021-04-28 NOTE — PROGRESS NOTES
Physical Therapy Daily Progress Note/ Discharge Note     Patient: Zuleyka Maharaj   : 1955  Diagnosis/ICD-10 Code:     Diagnosis Plan   1. Generalized weakness     2. History of COVID-19     3. Decreased strength, endurance, and mobility       Referring practitioner: Daniel DAHL*  Date of Initial Visit: Type: THERAPY  Noted: 2021  Today's Date: 2021  Patient seen for 21 sessions      PT Recheck Due: N/A  PT MD Visit: 2021       Zuleyka Maharaj         Subjective       Objective          Active Range of Motion   Left Shoulder   Abduction: 105 degrees     Right Shoulder   Abduction: 110 degrees       See Exercise, Manual, and Modality Logs for complete treatment.       Assessment & Plan     Assessment  Assessment details: Pt verbalizes good recall with HEP using tbands, hand weight and pulleys.  Pt able to recall most exercises and techniques.  Provided pt with written handout for 3 way pulleys and tband rows and was issued a door loop for home use.  Pt education on importance of continuance of HEP to maintain and continued improvements.  Encouraged to walk as much as possible, perform standing therex over seated therex and to perform 2-3 exercises every day for at least 15 minutes.  Pt has met most goals with exceptions of gait distance goal and LTG for shoulder ROM.      Goals  Plan Goals: Short Term Goals:  1) Patient I with HEP and have additoins/changes by next recertification. (met)    2) Patient able to ambulate 300 feet with SC and no SOA and no LOB and O2 saturation >= 94%.  (met)    3) Patient able to perform sit to/from stand with 1 UE A = WB B LE x7, with good eccentric control in 30 seconds. (met)    4) TUG in <= 20 seconds with SC assistive device, good eccentric control and safely with no LOB.  (met)    5) 2 minute walk test covering a distance of 300 feet utilizing no assistive device with no SOA and no LOB, O2 saturation >= 94%. (met)    6) B Shoulder  flexion/abduction AROM >= 105° each. (met)    7) B bicep/tricep >= 4/5. (met)    8) B hips >= 4-/5 in all directions. (met)    9) Patient to be utilizing pursed lip breathing to help keep O2 up when exercising and exertion. (MET)    Long Term Goals:  1) B UE >= 4+/5 (ongoing)    2) B Shoulder flexion/abduction AROM >= 125° each. (partially met)    3) B QS/HS >= 4+/5. (met)    4) B hips >= 4/5 in all directions. (met)    5) Patient able to ascend/descend 3 steps reciprocally with 1 hand rail assist, minimal SOA x10 reps. (met)     6) Patient able to ambulate with no assistive device non-antalgicaly >= 1200 feet. (not met)    7) TUG in <= 20 seconds with no assistive device, good eccentric control and safely with no LOB. (met)    8) 6 minute walk test covering a distance of >= 1/4 mile utilizing no assistive device with minimal SOA. (not met)    9) Patient to be I with final HEP. (ongoing)    Plan  Plan details: Pt is discharged at this time with most goals met and demonstrates independence with HEP.              Timed:  Manual Therapy:         mins  92538;  Therapeutic Exercise:    46     mins  65624;   Aquatic Therex :        mins  25989    Neuromuscular Melissa:        mins  12481;    Therapeutic Activity:          mins  62471;     Gait Training:           mins  32786;     Ultrasound:          mins  24719;    Electrical Stimulation:         mins  87802 ( );    Untimed:  Electrical Stimulation:         mins  38607 ( );  Mechanical Traction:         mins  27700;   Orthotic fit/train:         mins  88222    Timed Treatment:   46   mins   Total Treatment:     46   mins  Shayy Jesus PTA  Physical Therapist Assistant        This document has been electronically signed by Shayy Jesus PTA on April 28, 2021 15:10 CDT

## (undated) DEVICE — BIPOLAR SEALER 23-113-1 AQM 2.3 OM NEURO: Brand: AQUAMANTYS ®

## (undated) DEVICE — AGENT HEMSTAT 3GM OXIDIZED REGENERATED CELOS ABSRB FOR CONT (ORDER MULTIPLES OF 5EA)

## (undated) DEVICE — NEEDLE ECHOGENIC 20GA L4IN INSUL W/ 30DEG BVL EXTN SET

## (undated) DEVICE — GLOVE SURG SZ 8 L12IN FNGR THK13MIL BRN LTX SYN POLYMER W

## (undated) DEVICE — GLOVE SURG SZ 85 L12IN FNGR ORTHO 126MIL CRM LTX FREE

## (undated) DEVICE — SYSTEM SKIN CLSR 22CM DERMBND PRINEO

## (undated) DEVICE — DUAL CUT SAGITTAL BLADE

## (undated) DEVICE — Device: Brand: POWER-FLO®

## (undated) DEVICE — FAN SPRAY KIT: Brand: PULSAVAC®

## (undated) DEVICE — C-ARM: Brand: UNBRANDED

## (undated) DEVICE — Z INACTIVE USE 2660664 SOLUTION IRRIG 3000ML 0.9% SOD CHL USP UROMATIC PLAS CONT

## (undated) DEVICE — SUTURE VCRL SZ 1 L18IN ABSRB UD L36MM CT-1 1/2 CIR J841D

## (undated) DEVICE — GLOVE SURG SZ 75 L12IN FNGR THK79MIL GRN LTX FREE

## (undated) DEVICE — ZIMMER® STERILE DISPOSABLE TOURNIQUET CUFF WITH PLC, DUAL PORT, SINGLE BLADDER, 34 IN. (86 CM)

## (undated) DEVICE — NON-STERILE (14 X 30CM) COVER: Brand: CIV-FLEX™ TRANSDUCER COVER

## (undated) DEVICE — SURGICAL PROCEDURE PACK KNEE TOT DBD CDS LOURDES HOSP LF

## (undated) DEVICE — SOLUTION IV 250ML 0.9% SOD CHL PH 5 INJ USP VIAFLX PLAS

## (undated) DEVICE — SHEET,DRAPE,53X77,STERILE: Brand: MEDLINE

## (undated) DEVICE — NON-WOVEN ADHESIVE WOUND DRESSING: Brand: PRIMAPORE ADHESIVE DRESSING 30*10CM

## (undated) DEVICE — STERILE POLYISOPRENE POWDER-FREE SURGICAL GLOVES: Brand: PROTEXIS

## (undated) DEVICE — GEL US 20GM NONIRRITATING OVERWRAPPED FILE PCH TRNSMIT

## (undated) DEVICE — 6.3MM ROUND FAST CUTTING BUR

## (undated) DEVICE — BLADE SAW W12.5XL70MM THK1MM RECIP DBL SIDE OFFSET

## (undated) DEVICE — CHLORAPREP 26ML ORANGE

## (undated) DEVICE — SUTURE VCRL SZ 2-0 L36IN ABSRB UD L36MM CT-1 1/2 CIR J945H

## (undated) DEVICE — GLOVE SURG SZ 85 CRM LTX FREE POLYISOPRENE POLYMER BEAD ANTI

## (undated) DEVICE — SUTURE VCRL SZ 2-0 L27IN ABSRB UD L26MM SH 1/2 CIR J417H

## (undated) DEVICE — ROD RMR L950MM DIA2.5MM W/ EXTN BALL TIP

## (undated) DEVICE — ACCY PA100-A LEGEND LUB/DIFFUSER 4 PACK: Brand: MIDAS REX®

## (undated) DEVICE — ARM BOARD PAD: Brand: DEVON

## (undated) DEVICE — 3M™ STERI-DRAPE™ INSTRUMENT POUCH 1018: Brand: STERI-DRAPE™

## (undated) DEVICE — 2108 SERIES SAGITTAL BLADE (12.5 X 0.64 X 73.8MM)

## (undated) DEVICE — GOWN,PRECEPT,XLNG/XXLARGE,STRL: Brand: MEDLINE

## (undated) DEVICE — 3M™ IOBAN™ 2 ANTIMICROBIAL INCISE DRAPE 6651EZ: Brand: IOBAN™ 2

## (undated) DEVICE — GLOVE SURG SZ 85 L12IN FNGR THK79MIL GRN LTX FREE

## (undated) DEVICE — BLADE RMR L51MM PAT PILOT H

## (undated) DEVICE — DRAPE,ORTHOMAX ,HIP,W/POUCHES: Brand: MEDLINE

## (undated) DEVICE — TRAY EPI 25GA L3.5IN 0.75% BIPIVCAIN 8.25% D CONTAIN BPA

## (undated) DEVICE — TOWEL,OR,DSP,ST,BLUE,DLX,4/PK,20PK/CS: Brand: MEDLINE

## (undated) DEVICE — TOOL 21BA90 LEGEND 21CM 9MM BA: Brand: MIDAS REX

## (undated) DEVICE — ZINACTIVE USE 2539609 APPLICATOR MEDICATED 10.5 CC SOLUTION HI LT ORNG CHLORAPREP

## (undated) DEVICE — SOLUTION IV IRRIG POUR BRL 0.9% SODIUM CHL 2F7124

## (undated) DEVICE — STRYKER PERFORMANCE SERIES SAGITTAL BLADE: Brand: STRYKER PERFORMANCE SERIES

## (undated) DEVICE — PAD,ARMBOARD,CONV,FOAM,2X8X20",12PR/CS: Brand: MEDLINE